# Patient Record
Sex: MALE | Race: WHITE | Employment: OTHER | URBAN - METROPOLITAN AREA
[De-identification: names, ages, dates, MRNs, and addresses within clinical notes are randomized per-mention and may not be internally consistent; named-entity substitution may affect disease eponyms.]

---

## 2017-01-16 ENCOUNTER — HOSPITAL ENCOUNTER (OUTPATIENT)
Dept: WOUND CARE | Age: 77
Discharge: HOME | End: 2017-01-16
Payer: MEDICARE

## 2017-01-16 PROCEDURE — 97597 DBRDMT OPN WND 1ST 20 CM/<: CPT

## 2017-01-16 PROCEDURE — 99212 OFFICE O/P EST SF 10 MIN: CPT

## 2017-01-16 RX ORDER — LIDOCAINE HYDROCHLORIDE 20 MG/ML
JELLY TOPICAL
Status: COMPLETED | OUTPATIENT
Start: 2017-01-16 | End: 2017-01-16

## 2017-01-16 RX ADMIN — LIDOCAINE HYDROCHLORIDE 5 ML: 20 JELLY TOPICAL at 14:26

## 2017-01-17 NOTE — PROGRESS NOTES
[address] [place] [facility] [address] [place] [address]   WOUND CARE PROGRESS NOTE       Name:  Sandra Moe   MR#:  [id number]   :  1940   Account #:  [de-identified]        Date of Adm:  2017       DATE OF SERVICE: 2017      TREATING PHYSICIAN: Aime Blanca DPM    SUBJECTIVE: The patient presents today with his wife complaining of   a sore spot on the tip of his left great toe. The patient states that this   has been present for about a month. He denies any acute trauma to   the area. The wife states that the toe is very tender. He states that she   noticed some dry dead skin along the tip of the toe, which he tried to   trim on her own, but it was too painful for the patient to tolerate. The   patient states he is getting fitted for his diabetic shoes this Wednesday. States his blood sugars have been in the 140s during the day and   maybe 190s at night. He arrived wearing sandals today. Wife states   that the patient will ambulate at home wearing sock feet. PHYSICAL EXAMINATION   VITAL SIGNS: Stable. The patient is afebrile. LEFT FOOT EXAM: There is a 0.5 x 0.5 x 0.1 cm wound along the   distal tip of the left great toe. The wound has a granular base with no   acute signs of infection. DP, PT pulses are nonpalpable. The foot is   cool to touch. Protective sensation is diminished along the toes. ASSESSMENT   1. Left great toe wound. 2. Diabetes with peripheral neuropathy. 3. Acquired deformity, left great toe. PLAN   1. I had a long discussion with the patient and his wife this afternoon. Clinically, it appears that the patient developed a pressure wound   along the tip of his great toe. Upon further discussion with the patient, it   turns out that he wore a pair of sneakers recently which may have   been the contributing factor. 2. I explained to them that clinically the wound is superficial and does   not appear to be infected.  We will start Aquacel AG and a dry bandage   every day. 3. The patient is to get fitted for his diabetic shoes this Wednesday and   that shoe will be the most appropriate type of shoe gear for him to   wear considering that he will need something that has a wide toe box   with room for his toes to spread out. 4. The patient to follow with me in 2 weeks for a recheck. I advised him   to work on getting his blood sugars under control as that would have a   direct impact on his wound healing. KINA Singh / Mya Telles   D:  01/16/2017   14:49   T:  01/16/2017   19:35   Job #:  [id number]     FAX COPY OF REPORT TO DR. Linus Gilliam.

## 2017-02-13 ENCOUNTER — HOSPITAL ENCOUNTER (OUTPATIENT)
Dept: WOUND CARE | Age: 77
Discharge: HOME | End: 2017-02-13
Payer: MEDICARE

## 2017-02-13 PROCEDURE — 97597 DBRDMT OPN WND 1ST 20 CM/<: CPT

## 2017-02-13 RX ORDER — LIDOCAINE HYDROCHLORIDE 20 MG/ML
JELLY TOPICAL
Status: COMPLETED | OUTPATIENT
Start: 2017-02-13 | End: 2017-02-13

## 2017-02-13 RX ADMIN — LIDOCAINE HYDROCHLORIDE 5 ML: 20 JELLY TOPICAL at 13:20

## 2017-02-13 NOTE — PROGRESS NOTES
[address] [place] [facility] [address] [place] [address]   WOUND CARE PROGRESS NOTE       Name:  Paul Faust   MR#:  [id number]   :  1940   Account #:  [de-identified]        Date of Adm:  2017       DATE OF SERVICE: 2017    TREATING PHYSICIAN: Terri Parish DPM    SUBJECTIVE: The patient presents today with his wife for followup to   left distal great toe wound. He denies any nausea, vomiting, fevers,   night sweats or chills. States that he is still waiting on his diabetic   shoes. States that  ordered the wrong type of shoe, and he had   to have his order put in again to get the correct pair of shoes. PHYSICAL EXAMINATION   VITAL SIGNS: Stable. The patient is afebrile. LEFT FOOT: There 0.1 x 0.1 x 0.1 cm wound along the distal tip of the   left great toe. The wound is granular and superficial without any acute   signs of infection. DP and PT pulses are palpable. Protective sensation   is diminished. ASSESSMENT   1. Left great toe ulcer. 2. Diabetes, with peripheral neuropathy. PLAN   1. I had a long discussion with the patient and his wife this afternoon. Clinically, his left great toe wound has healed remarkably. The wound   itself is about 99% healed. I instructed the wife, who is doing his wound   care to, just switch to a dry bandage and change it every day. The   wound should finish healing within the next week or so. 2. The patient is to start wearing his diabetic shoes once he gets them   and they are fitted appropriately. 3. The patient is discharged from the wound care center. He is to   follow with me in my regular office for his normal diabetic checkups.         KINA [facility]   D:  2017   14:44   T:  2017   16:14   Job #:  [id number]

## 2017-03-20 ENCOUNTER — HOSPITAL ENCOUNTER (OUTPATIENT)
Dept: WOUND CARE | Age: 77
Discharge: HOME | End: 2017-03-20
Payer: MEDICARE

## 2017-03-20 PROCEDURE — 97597 DBRDMT OPN WND 1ST 20 CM/<: CPT

## 2017-03-20 RX ORDER — TRAMADOL HYDROCHLORIDE 50 MG/1
50 TABLET ORAL
COMMUNITY
End: 2019-10-14

## 2017-03-21 NOTE — PROGRESS NOTES
[address] [facility] [address] [place] [address]   WOUND CARE PROGRESS NOTE       Name:  Ernestina Duncan   MR#:  [id number]   :  1940   Account #:  [de-identified]        Date of Adm:  2017       DATE OF SERVICE: 2007    TREATING PHYSICIAN: Yumiko Hernandez DPM    SUBJECTIVE: The patient presents today complaining that he has a   wound on the tip of his left great toe as well as the bottom of his left   heel. States that the wounds have been present for the past few   weeks. States his wife has been doing his wound care every day   consisting of Aquacel AG every day. The patient arrives wearing his   diabetic shoes. Denies any trauma to his left foot. Denies any nausea,   vomiting, fevers, night sweats, chills. SUBJECTIVE   VITAL SIGNS: Stable. The patient is afebrile. LEFT FOOT EXAM: There is a 0.5 x 0.7 x 0.1 cm wound along the   distal tip of the left great toe. The wound has a granular base and is   covered by soft eschar. There is no purulent drainage, no Malodor, no   erythema, no edema, no probe to bone. There is also a 0.5 x 0.3 x 0.1   cm wound along the plantar central aspect of the left heel. The wound   is covered with a soft eschar. There are no acute signs of infection. No   deep probe. DP pulse is palpable, PT pulse is weakly palpable. Protect   sensation is diminished. ASSESSMENT   1. Left great toe wound. 2. Left heel wound. 3. Diabetes with peripheral neuropathy. PLAN: I had a long discussion with the patient this afternoon. All   wound sites were anesthetized with topical lidocaine, and once   anesthesia was achieved, they were excisionally debrided using sterile   curette down to a bleeding granular base. Clinically, both wounds are very superficial and granular. I am not sure   why these wounds reappeared, as they were healed at his last visit   and he has been in diabetic shoes, which he started to wear recently.      We will continue Aquacel , and I have instructed the patient to have   his wife to change the routine from every day to every other day. He is to continue using his diabetic shoes. He is to followup with me in 2 weeks for a recheck.         KINA Perez / Jolene Alonzo   D:  03/20/2017   15:04   T:  03/20/2017   22:20   Job #:  [id number]

## 2017-04-03 ENCOUNTER — HOSPITAL ENCOUNTER (OUTPATIENT)
Dept: WOUND CARE | Age: 77
Discharge: HOME | End: 2017-04-03
Payer: MEDICARE

## 2017-04-03 PROCEDURE — 97597 DBRDMT OPN WND 1ST 20 CM/<: CPT

## 2017-04-03 RX ORDER — LIDOCAINE HYDROCHLORIDE 20 MG/ML
JELLY TOPICAL
Status: COMPLETED | OUTPATIENT
Start: 2017-04-03 | End: 2017-04-03

## 2017-04-03 RX ADMIN — LIDOCAINE HYDROCHLORIDE 5 ML: 20 JELLY TOPICAL at 14:54

## 2017-04-04 NOTE — PROGRESS NOTES
[address] [place] [facility] [place] [address]   WOUND CARE PROGRESS NOTE       Name:  Jacqualine Boeck   MR#:  [id number]   :  1940   Account #:  [de-identified]        Date of Adm:  2017       DATE OF SERVICE: 2017    TREATING PHYSICIAN: Jerome Encarnacion DPM    SUBJECTIVE: The patient presents today with his wife for followup to   the left distal great toe as well as left heel wounds. Wife states that he   has developed a new blister on the inner aspect of his left heel that she   noticed a few days ago. She states she did not pop it. States that it   was clear in color at first, but now is dark in color. The patient denies   any nausea, vomiting, fevers, night sweats or chills. PHYSICAL EXAMINATION   VITAL SIGNS: Stable. The patient is afebrile. LEFT FOOT: There is a 1.8 x 2.3 x 0.1 cm blister along the medial   aspect of the left heel, the blister appears to contain dark coagulated   blood. There is no surrounding erythema. There is a distal left great toe   wound measuring 0.5 x 0.5 x 0.1 cm with superficial granular base with   no acute signs of infection; as well as a posterior heel wound   measuring 0.7 x 0.5 x 0.1 cm with superficial granular base, no acute   signs of infection. DP, PT pulses are palpable. Protective sensation is   diminished. ASSESSMENT   1. Left diabetic foot wounds x2.   2. Left heel blister. 3. Diabetes with peripheral neuropathy. PLAN   1. At today's visit, I used a 15 blade to incise the blister multiple times   in order to allow all underlying drainage to be expressed. The drainage   consists of coagulated blood, there was no pus.   2. Using a 15 blade, I excisionally debrided the left great toe wound. We will continue Aquacel to the tip   of the left great toe. The heel wound appears to be dry and we will stop   Aquacel on this.    3. I instructed the patient's wife to apply to a non-adherent bandage to   the left medial heel blister and dry dressing to the left heel wound and   only use Aquacel on the tip of the left great toe. Clinically, the left great   toe wound appears to be due to microtrauma or some type of irritation. The patient does admit to stubbing that toe on occasion, however, he   is not clear how often he does do it. With his sense of neuropathy, I   have a strong feeling that he is bumping the toe more than he believes   he is. 4. The patient to follow up in 2 weeks for a recheck.         KINA Salgado / KASIA   D:  04/03/2017   16:21   T:  04/03/2017   23:58   Job #:  [id number]

## 2017-04-17 ENCOUNTER — HOSPITAL ENCOUNTER (OUTPATIENT)
Dept: WOUND CARE | Age: 77
Discharge: HOME | End: 2017-04-17
Payer: MEDICARE

## 2017-04-17 PROCEDURE — 97597 DBRDMT OPN WND 1ST 20 CM/<: CPT

## 2017-04-18 NOTE — PROGRESS NOTES
[address] [place] [facility] [address] [place] [address]   WOUND CARE PROGRESS NOTE       Name:  Rm Rick   MR#:  [id number]   :  1940   Account #:  [de-identified]        Date of Adm:  2017       SUBJECTIVE: The patient comes today for follow up of his diabetic   foot ulcer to the distal left great toe, secondary to diabetes mellitus,   E11.621. The other to his left heel and food have healed. He is not   accompanied by a family member, but reports no other significant   change in his health or review of systems. No nausea, vomiting,   diarrhea, or fever, chills, or night sweats. OBJECTIVE   EXTREMITIES: On exam today, the ulcer is slightly larger, and   covered by a thick, black eschar. The measurement is 0.8 x 0.8 x 0.1. He has redness to the great toe to his interphalangeal joint, but no   warmth. There is no odor, no drainage noted, and it looks like he has   had this for a while. I did outline this area, and we discussed this at the   end of the clinic visit. After discussing risks and benefits, obtained informed consent,   identifying the patient and taking a time-out to discuss selective   debridement of his thick, dark eschar, Xylocaine gel was applied, as   well as saline to hydrate the eschar. A 5 mm ring curet was used to   remove this eschar down to healthy, bleeding tissue. The   measurements did not change. We will switch from Aquacel AG,   which I think may be drawing it out too much, to Nevaeh, with Hydrogel. He will change this every 3 days, and we have written instructions for   he and his wife. Dr. Radha Valero will see him back next week. He is   advised to call if the redness extends beyond the demarcation lines   that I placed on him today.          MD Kamilla Mueller / Versie Clubs   D:  2017   16:07   T:  2017   00:08   Job #:  [id number]

## 2017-05-02 ENCOUNTER — HOSPITAL ENCOUNTER (OUTPATIENT)
Dept: WOUND CARE | Age: 77
Discharge: HOME | End: 2017-05-02
Payer: MEDICARE

## 2017-05-02 PROCEDURE — 11042 DBRDMT SUBQ TIS 1ST 20SQCM/<: CPT

## 2017-05-02 RX ORDER — LIDOCAINE HYDROCHLORIDE 20 MG/ML
JELLY TOPICAL
Status: COMPLETED | OUTPATIENT
Start: 2017-05-02 | End: 2017-05-02

## 2017-05-02 RX ADMIN — LIDOCAINE HYDROCHLORIDE: 20 JELLY TOPICAL at 13:58

## 2017-05-02 NOTE — PROGRESS NOTES
[address] [facility], [address] [facility]   WOUND CARE PROGRESS NOTE       Name:  Wilton Patel   MR#:  [id number]   :  1940   Account #:  [de-identified]        Date of Adm:  2017       DATE OF SERVICE: 2017    CHIEF COMPLAINT: \"I have a wound on my left great toe. \"    HISTORY OF PRESENT ILLNESS: The patient is following up at the   wound care clinic for a diabetic foot ulcer on the distal aspect of his left   great toe. The patient is doing regular dressing changes. He has no   pain associated with the wound. He has been offloading the end of his   great toe by wearing sandals. He denies any nausea, vomiting, fevers   or chills. He has moderate diabetic control. PHYSICAL EXAMINATION   GENERAL: On exam, he is sitting comfortably on the bed. VITAL SIGNS: His temperature is 98.1, respirations 20, pulse 62,   blood pressure is 122/69. Left great toe wound measures 0.8 cm in   length, 0.8 cm in width and 0.1 cm in depth. There is some slough and   necrotic tissue with an eschar and there is a clear serous drainage. There is no surrounding erythema. There is no swelling. There is no   odor. There is no tenderness. ASSESSMENT: Diabetic foot ulcer, Blum grade 1, stable. Treatment   today consisted of debridement of the subcutaneous tissue of less than   20 square cm. Consent was obtained. Time-out was taken. Lidocaine   2% topical gel was used as anesthetic agent. A 5-0 curette was used   to remove the excess slough and necrotic subcutaneous tissue, both   adherent and nonadherent, both nonviable and viable tissue was   removed and discarded. The patient tolerated the procedure well. There was a minimal amount of bleeding, which was controlled from   pressure from a gauze. The posterior measurement was 0.8 cm in   length, 0.8 cm in width and 0.2 cm in depth. The wound was then   cleaned with saline.  Santyl was applied, followed moist gauze, then dry gauze.    PLAN: The patient will continue with regular dressing. I have   recommended he get more formal noninvasive arterial studies. If any   issues, he is going to call or come back sooner; otherwise, he will be   re-evaluated in 1 week.         Gisela Sinclair MD      PV / THS   D:  05/02/2017   15:08   T:  05/02/2017   15:33   Job #:  [id number]

## 2017-05-08 ENCOUNTER — HOSPITAL ENCOUNTER (OUTPATIENT)
Dept: VASCULAR SURGERY | Age: 77
Discharge: HOME | End: 2017-05-08
Attending: FAMILY MEDICINE
Payer: MEDICARE

## 2017-05-08 DIAGNOSIS — E11.69 DIABETIC FOOT ULCER WITH OSTEOMYELITIS (HCC): ICD-10-CM

## 2017-05-08 DIAGNOSIS — L97.522 ULCER OF LEFT FOOT, WITH FAT LAYER EXPOSED (HCC): ICD-10-CM

## 2017-05-08 DIAGNOSIS — M86.9 DIABETIC FOOT ULCER WITH OSTEOMYELITIS (HCC): ICD-10-CM

## 2017-05-08 DIAGNOSIS — E11.621 DIABETIC FOOT ULCER WITH OSTEOMYELITIS (HCC): ICD-10-CM

## 2017-05-08 DIAGNOSIS — L97.509 DIABETIC FOOT ULCER WITH OSTEOMYELITIS (HCC): ICD-10-CM

## 2017-05-08 PROCEDURE — 93923 UPR/LXTR ART STDY 3+ LVLS: CPT

## 2017-05-08 NOTE — PROCEDURES
[address] [place] [address]  *** FINAL REPORT ***    Name: Lali Prado  MRN: [id number]    Outpatient  : 15 Feb 1940  HIS Order #: [id number]  [address] [facility] Visit #: [id number]  Date: 08 May 2017    TYPE OF TEST: Peripheral Arterial Testing    REASON FOR TEST  Wound lrft great toe,  REGINA and TBI    Right Leg  Segmentals: Normal                     mmHg  Brachial         102  High thigh  Low thigh        140  Calf             119  Posterior tibial 133  Dorsalis pedis    82  Peroneal  Metatarsal  Toe pressure      51  Doppler:  PVR:  Ankle/Brachial: 1.17    Left Leg  Segmentals: Normal                     mmHg  Brachial         114  High thigh  Low thigh        125  Calf             109  Posterior tibial 140  Dorsalis pedis   121  Peroneal  Metatarsal  Toe pressure      80  Doppler:  PVR:  Ankle/Brachial: 1.23    INTERPRETATION/FINDINGS  PROCEDURE:  Evaluation of lower extremity arteries with multilevel  systolic blood pressure measurements and pulse volume recording (PVR)  plethysmography. Includes calculation of the ankle/brachial pressure  indices (REGINA's). FINDINGS:  REGINA is normal on the right and the left. PVR waveforms on  the right are normal at the thigh, calf, and ankle. PVR waveforms on  the left are normal at the thigh, calf, and ankle. PPG waveforms are  severely abnormal at the right great toe and normal at the left great  toe. Right great toe pressure is reduced    IMPRESSION:  No evidence of hemodynamically significant lower  extremity arterial obstruction. There is reduced pressure and flow at   the right great toe. ADDITIONAL COMMENTS    I have personally reviewed the data relevant to the interpretation of  this  study.     TECHNOLOGIST: Bj Monae RVT  Signed: 2017 04:05 PM    PHYSICIAN: Joce Cisneros MD  Signed: 2017 07:12 AM

## 2017-05-09 ENCOUNTER — HOSPITAL ENCOUNTER (OUTPATIENT)
Dept: WOUND CARE | Age: 77
Discharge: HOME | End: 2017-05-09
Payer: MEDICARE

## 2017-05-09 PROCEDURE — 11042 DBRDMT SUBQ TIS 1ST 20SQCM/<: CPT

## 2017-05-09 RX ORDER — LIDOCAINE HYDROCHLORIDE 20 MG/ML
JELLY TOPICAL
Status: COMPLETED | OUTPATIENT
Start: 2017-05-09 | End: 2017-05-09

## 2017-05-09 RX ADMIN — LIDOCAINE HYDROCHLORIDE: 20 JELLY TOPICAL at 14:04

## 2017-05-09 NOTE — PROGRESS NOTES
[address] [place] [facility] [address]   WOUND CARE PROGRESS NOTE       Name:  Tameka Rubalcava   MR#:  [id number]   :  1940   Account #:  [de-identified]        Date of Adm:  2017       DATE OF SERVICE: 2017. CHIEF COMPLAINT: \"I have a wound on my left great toe. \" . HISTORY OF PRESENT ILLNESS: The patient is following up at the   wound care clinic for a diabetic foot ulcer on the distal aspect of his left   great toe. The patient had his noninvasive arterial studies completed,   which showed no significant hemodynamic blockage in the left foot. There was some decreased perfusion in the right great toe. The patient   had been doing a regular dressing change with the assistance of his   wife with the application of Santyl. He has been offloading the toe by   wearing open shoe sandals. He denies any nausea, vomiting, fevers or   chills. He has no pain associated with the wound. PHYSICAL EXAMINATION   GENERAL: On exam, he is sitting comfortably in the bed. VITAL SIGNS: His temperature 98.3, respirations 16, pulse 72, blood   pressure 149/74. EXTREMITIES: Has a wound on the distal aspect of his left great toe   that measures 0.6 cm in length, 0.6 cm in width, and 0.1 cm in depth. Some slough and necrotic subcutaneous tissue. There is a clear   serous discharge. There is no surrounding erythema. There is no odor. There is no tenderness. ASSESSMENT: Diabetic foot ulcer, Blum grade 1, stable. Treatment today consisted of debridement of subcutaneous tissue   of less than 20 square cm. Consent was obtained. Risks were   reviewed. A time-out was taken. Lidocaine 2% topical gel was used as   anesthetic agent. A 5-0 curette was used to remove the excess slough   and necrotic subcutaneous tissue. Both adherent and nonadherent,   both viable and nonviable tissue was removed and discarded. The   patient tolerated the procedure well.  There was minimal amount of   bleeding, which was controlled from pressure from a gauze. The post-  debridement measurement was 0.6 cm in length, 0.6 cm in width, and   0.2 cm in depth. The wound was cleaned with saline. Santyl was   applied, followed by gauze and then dry gauze. PLAN: The patient will continue with the daily dressing changes with   the application of Santyl. He is encouraged to continue offloading his   toe. If he has any issues, he will call or come back sooner; otherwise,   he will be reevaluated in 1 week.         Colletta Mccune, MD Bubba Haggis / Cassandra.Fill   D:  05/09/2017   14:46   T:  05/09/2017   15:10   Job #:  [id number]

## 2017-05-16 ENCOUNTER — HOSPITAL ENCOUNTER (OUTPATIENT)
Dept: WOUND CARE | Age: 77
Discharge: HOME | End: 2017-05-16
Payer: MEDICARE

## 2017-05-16 PROCEDURE — 74011000250 HC RX REV CODE- 250: Performed by: FAMILY MEDICINE

## 2017-05-16 PROCEDURE — 11042 DBRDMT SUBQ TIS 1ST 20SQCM/<: CPT

## 2017-05-16 RX ORDER — LIDOCAINE HYDROCHLORIDE 20 MG/ML
JELLY TOPICAL
Status: COMPLETED | OUTPATIENT
Start: 2017-05-16 | End: 2017-05-16

## 2017-05-16 RX ADMIN — LIDOCAINE HYDROCHLORIDE: 20 JELLY TOPICAL at 13:42

## 2017-05-16 NOTE — PROGRESS NOTES
[address] [place] [facility] [address] [facility]   WOUND CARE PROGRESS NOTE       Name:  Carolyn Hathaway   MR#:  [id number]   :  1940   Account #:  [de-identified]        Date of Adm:  2017       DATE OF SERVICE: 2017. CHIEF COMPLAINT: \"I have a wound on my left great toe. \"     HISTORY OF PRESENT ILLNESS: The patient is following up at the   wound care clinic for a diabetic foot ulcer on the distal aspect of his left   great toe. The patient has been doing well this past week with regular   dressing changes applying Santyl with the assistance of his wife. He   has no pain associated with the wound. He denies any nausea,   vomiting, fevers or chills. He has been offloading the toe while   wearing sandals and avoiding pressure from other means. PHYSICAL EXAMINATION   GENERAL: On exam, he is sitting comfortably on the bed. VITAL SIGNS: His temperature 97.7, his respirations 18, pulse 54,   blood pressure is 171/70. EXTREMITIES: He has a wound on his left great toe that measures   0.8 cm in length, 0.9 cm in width and 0.1 cm in depth. There is some   slough. There is some necrotic tissue. There is a clear serous   drainage. There is no sign of erythema. There is no odor. There is no   tenderness. ASSESSMENT: Diabetic foot ulcer of the left great   toe, stable. Considered a Blum grade 1. Treatment today consisted   of debridement of subcutaneous tissue of less than 20 square cm. Consent was obtained. Risks were reviewed. A time-out was taken. Lidocaine 2% topical gel was used as an anesthetic agent. A 5-0   curette was used to remove the excess slough and necrotic   subcutaneous tissue. Both adherent and nonadherent, both viable and   nonviable tissue was removed and discarded. The patient tolerated the   procedure well. There was a minimal amount of bleeding, which was   controlled with pressure from a gauze.  The post-debridement   measurement was 0.8 cm in length, 0.9 cm in width, and 0.2 cm in   depth. The wound was then cleaned with saline. Santyl was applied,   followed by a moist gauze, then a dry gauze. PLAN: The patient will continue with his daily dressing with the   application of Santyl. He was encouraged to elevate his legs. I   recommended that he continue the offloading by wearing the sandals   and avoiding other forms of pressure. If he has any issues, he will call   or come back sooner. Otherwise, he will be reevaluated in 1 week.         MD YARY Vera / Lilly.Hives   D:  05/16/2017   14:28   T:  05/16/2017   15:02   Job #:  [id number]

## 2017-05-23 ENCOUNTER — HOSPITAL ENCOUNTER (OUTPATIENT)
Dept: WOUND CARE | Age: 77
Discharge: HOME | End: 2017-05-23
Payer: MEDICARE

## 2017-05-23 PROCEDURE — 11042 DBRDMT SUBQ TIS 1ST 20SQCM/<: CPT

## 2017-05-23 NOTE — PROGRESS NOTES
[address] [place] [facility] [address]   WOUND CARE PROGRESS NOTE       Name:  Alesha Aguilar   MR#:  [id number]   :  1940   Account #:  [de-identified]        Date of Adm:  2017       DATE OF SERVICE: 2017    CHIEF COMPLAINT: \"I have a wound on my left great toe. \"    HISTORY OF PRESENT ILLNESS: The patient is following up at the   wound care clinic for a diabetic foot ulcer on the distal aspect of his left   great toe. The patient has been doing well since the last visit, applying   Santyl on a regular basis. He has no pain associated with the wound. He has been offloading the toe by wearing an open toed shoe. Denies   any nausea, vomiting, fevers or chills. PHYSICAL EXAMINATION   GENERAL: On exam, he is sitting comfortably in the chair. VITAL SIGNS: His temperature is 98.2, respirations 18, pulse 65,   blood pressure 135/64. EXTREMITIES: He has a wound on the distal aspect of the left great   toe that measures 0.3 cm in length, 0.2 cm in width and 0.1 cm in   depth. There is some slough and necrotic subcutaneous tissue. There   is clear serous discharge. There is no sign erythema. There is no odor. There is no tenderness. ASSESSMENT: Diabetic foot ulcer of the left great toe, healing. The   wound may be considered a Blum grade 1 wound. Treatment today   consisted of debridement of the subcutaneous tissue of less than 20   square cm. Consent was obtained. Risks were reviewed. Time-out was   taken. Lidocaine 2% topical gel was used as anesthetic agent. A 5-0   curette was used to remove the excess slough and necrotic   subcutaneous tissue, both adherent and nonadherent, both viable and   nonviable tissue was removed and discarded. The patient tolerated the   procedure well. There was a minimal amount of bleeding, which was   controlled from pressure from a gauze.  The post-debridement   measurement was 0.3 cm in length, 0.2 cm in width and 0.2 cm in depth. The wound was cleaned with saline and Santyl was applied,   followed by a moist gauze and dry gauze. PLAN: The patient will continue with the daily dressing and offloading. If he has any issues, he will call or come back sooner; otherwise, he   will be reevaluated in 2 weeks.         MD YARY Gilbert / JUAN MIGUEL   D:  05/23/2017   13:43   T:  05/23/2017   14:04   Job #:  [id number]

## 2017-06-06 ENCOUNTER — HOSPITAL ENCOUNTER (OUTPATIENT)
Dept: WOUND CARE | Age: 77
Discharge: HOME | End: 2017-06-06
Payer: MEDICARE

## 2017-06-06 PROCEDURE — 11042 DBRDMT SUBQ TIS 1ST 20SQCM/<: CPT

## 2017-06-06 PROCEDURE — 74011000250 HC RX REV CODE- 250: Performed by: FAMILY MEDICINE

## 2017-06-06 RX ORDER — LIDOCAINE HYDROCHLORIDE 20 MG/ML
JELLY TOPICAL
Status: COMPLETED | OUTPATIENT
Start: 2017-06-06 | End: 2017-06-06

## 2017-06-06 RX ADMIN — LIDOCAINE HYDROCHLORIDE 5 ML: 20 JELLY TOPICAL at 13:43

## 2017-06-06 NOTE — PROGRESS NOTES
[address] [place] [facility], [address] [facility]   WOUND CARE PROGRESS NOTE       Name:  Lizbeth Montgomery   MR#:  [id number]   :  1940   Account #:  [de-identified]        Date of Adm:  2017       DATE OF SERVICE: 2017    CHIEF COMPLAINT: \"I have a wound on my left great toe. \"     HISTORY OF PRESENT ILLNESS: The patient is following up at the   wound clinic for a diabetic foot ulcer on the distal aspect of his left   great toe. The patient was doing well over the past week, applying   Santyl and covered with a dressing. He has no pain associated with   the wound. He has been offloading his toe by wearing an open toe   shoe. He denies any nausea, vomiting, fevers or chills. PHYSICAL EXAMINATION   GENERAL: On exam, he is sitting comfortably in the bed. VITAL SIGNS: His temperature is 97.8, respirations 20, pulse 53,   blood pressure is 154/72. EXTREMITIES: He has a wound on the distal aspect of the left great   toe that measures 0.3 cm in length, 0.7 cm in width and 0.1 cm in   depth. There is some slough and necrotic subcutaneous tissue and   some periwound maceration. There is a clear serous discharge. There   is no surrounding erythema. There is no tenderness. ASSESSMENT: Diabetic foot ulcer of the left great toe, stable. The   wound would be considered a Blum grade 1 wound. Treatment today consisted of debridement of the subcutaneous tissue   of less than 20 square cm. Consent was obtained. Risks were   reviewed. Time-out was taken. Lidocaine 2% topical gel was used as   the anesthetic agent. A 5.0 curette was used to remove the excess   slough and necrotic subcutaneous tissue. Both adherent and   nonadherent, both viable and nonviable tissue was removed and   discarded. The patient tolerated the procedure well. There was a   minimal amount of bleeding, which was controlled from pressure from   a gauze.  The post-debridement measurement was 0.3 cm in length, 0.7 cm in width and 0.2 cm in depth. The wound was cleaned with   saline and Hydrofera Blue Ready was applied and then secured with   rolled gauze. PLAN: The patient will continue with regular dressing change every   other day and applying Hydrofera Blue. He will continue with offloading   by wearing open toe shoe. He is also encouraged to elevate his legs   equal to his heart when he is at rest.     If there are any other issues, he will call or come in sooner; otherwise,   he will be reevaluated in 1 week.         Te Desai MD      PV / MS   D:  06/06/2017   14:00   T:  06/06/2017   14:21   Job #:  [id number]

## 2017-06-13 ENCOUNTER — HOSPITAL ENCOUNTER (OUTPATIENT)
Dept: WOUND CARE | Age: 77
Discharge: HOME | End: 2017-06-13
Payer: MEDICARE

## 2017-06-13 PROCEDURE — 99213 OFFICE O/P EST LOW 20 MIN: CPT

## 2017-06-13 NOTE — PROGRESS NOTES
[address] [facility] [place] [address]   WOUND CARE PROGRESS NOTE       Name:  Lewis Mallory   MR#:  [id number]   :  1940   Account #:  [de-identified]        Date of Adm:  2017       DATE OF SERVICE: 2017    CHIEF COMPLAINT: \"I have a wound on my left great toe. \"    HISTORY OF PRESENT ILLNESS: The patient is following up through   clinic for diabetic foot ulcer on the distal aspect of his left great toe. The   patient has been doing well this past week, having regular dressing   change with the assistance of his wife. He has been applying   Hydrofera Blue. He has been offloading the area by wearing open-toed   shoes. He has some pain associated with the wound and the whole   great toe. He has not noticed any change in the degree of pain or   swelling or redness. He denies any nausea, vomiting, fevers or chills. PHYSICAL EXAMINATION   GENERAL: On exam, he is sitting comfortably in the bed. VITAL SIGNS: His temperature is 97.9, respirations 22. Pulse 56,   blood pressure 149/73. EXTREMITIES: He has a wound on the distal aspect of his left great   toe that measures 0.3 cm in length, 0.8 cm in width and 0.1 cm in   depth. There is no slough. There is pink, spongy granulation tissue. There is a clear serous discharge. There is no surrounding erythema. There is no odor. There is slight tenderness of the wound on the toe. ASSESSMENT: Blum grade 1 diabetic foot ulcer of the left great toe   not healing. Treatment today consisted of cleaning the wound with   saline and then applying Santyl followed by [facility]. PLAN: I was concerned that the wound, although it is not worsening,   has not shown any healing over the past number of weeks. I would like   to get a more detailed vascular assessment, even though the   noninvasive studies with toe pressures were normal of the left great   toe.  I am concerned that there might be some microvascular process and that is limiting the healing, although I understand if that is the case,   then there is very little that can be done. I also want to assess the   wound for may be some underlying bony abnormalities since the whole   toe seems to be a little bit tender. I have requested that he complete   an MRI with contrast to check for osteo. I have ordered blood work at   [facility] and sedimentation rate and a C-reactive protein in addition to these   other consults. If he has issues, he will call or come back sooner;   otherwise, he will be reevaluated in 1 week.         Amita Lloyd MD      PV / CONRAD   D:  06/13/2017   15:55   T:  06/13/2017   16:23   Job #:  [id number]

## 2017-06-20 ENCOUNTER — HOSPITAL ENCOUNTER (OUTPATIENT)
Dept: WOUND CARE | Age: 77
Discharge: HOME | End: 2017-06-20
Payer: MEDICARE

## 2017-06-20 PROCEDURE — 97597 DBRDMT OPN WND 1ST 20 CM/<: CPT

## 2017-06-20 NOTE — PROGRESS NOTES
1500 [place] [facility] [address] [facility]   WOUND CARE PROGRESS NOTE       Name:  Marilyn Wan   MR#:  [id number]   :  1940   Account #:  [de-identified]        Date of Adm:  2017       DATE OF SERVICE: 2017     CHIEF COMPLAINT: \"I have a wound on my left great toe. \"    HISTORY OF PRESENT ILLNESS: The patient is following up at the   wound care clinic for a diabetic foot ulcer on the distal aspect of his left   great toe. The patient has been doing well this past week with regular   dressing change, applying Santyl and Hydrofera Blue with the   assistance from his wife. He has been offloading the area by wearing   open-toed shoes. He has no change in the degree of pain with the   wound. He completed his MRI study this past week. He also followed   up with the vascular surgeon. He denies any nausea, vomiting, fevers   or chills. PHYSICAL EXAMINATION: He is sitting comfortably in the chair. His   temperature is 98.0, respirations 22, pulse 54, blood pressure 111/64. He has a wound on the distal aspect of the left great toe that measures   0.2 cm in length, 0.3 cm in width and 0.1 cm in depth. There is some   slough. There is no necrotic tissue. There is a clear serous discharge. There is no surrounding erythema. There is no odor. There is no   tenderness. ASSESSMENT: Large grade 1 ulcer on the dorsal aspect of the left   great toe, stable. MRI was completed, which revealed underlying   osteomyelitis of the distal phalanx. Treatment today consisted of open   debridement of less than 20 square cm. Consent was obtained. Risks   reviewed. A time-out was taken. Lidocaine 2% topical gel was used as   anesthetic agent. A 5-0 curette was used to remove the excess slough,   which was discarded. There was a minimal amount of bleeding, which   was controlled with pressure from a gauze. The patient tolerated the   procedure well.  The post-debridement measurements were 0.2 cm in   length, 0.3 cm in width and 0.1 cm in depth. The wound was cleaned   with saline, and then Santyl was applied, followed by [facility]. PLAN: The patient will follow up next week with Podiatry. I also   recommended that he see Infectious Disease. The patient wanted to   wait until his Podiatry appointment to help determine the next step. The   patient did not get the blood work that I have ordered completed, he   may try to do that this week. If he has any issues, he will call or come   back sooner. Otherwise, he will be reevaluated next week by Podiatry.         MD YARY Kennedy / Alona Goodell   D:  06/20/2017   15:41   T:  06/20/2017   16:02   Job #:  [id number]

## 2017-06-26 ENCOUNTER — HOSPITAL ENCOUNTER (OUTPATIENT)
Dept: WOUND CARE | Age: 77
Discharge: HOME | End: 2017-06-26
Payer: MEDICARE

## 2017-06-26 PROCEDURE — 99213 OFFICE O/P EST LOW 20 MIN: CPT

## 2017-06-26 RX ORDER — LIDOCAINE HYDROCHLORIDE 20 MG/ML
JELLY TOPICAL
Status: COMPLETED | OUTPATIENT
Start: 2017-06-26 | End: 2017-06-26

## 2017-06-26 RX ADMIN — LIDOCAINE HYDROCHLORIDE: 20 JELLY TOPICAL at 12:57

## 2017-06-26 NOTE — PROGRESS NOTES
[address] [place] [facility] [place] [address] [facility]   WOUND CARE PROGRESS NOTE       Name:  Carolyn Hathaway   MR#:  [id number]   :  1940   Account #:  [de-identified]        Date of Adm:  2017       DATE OF SERVICE: 2017     TREATING PHYSICIAN: Florine Moritz, DPM    SUBJECTIVE: The patient presents today with his wife for followup to   a left great toe ulceration. The patient has been seeing Dr. Rena Grossman   here at the wound care center for this wound. The patient states that   he recently had an MRI of the left foot, as per Dr. Catalina Moya   instructions. The patient states that he wanted to see me due to the   findings on the MRI. He denies any nausea, vomiting, fevers, night   sweats or chills. States blood sugars are doing very well. PHYSICAL EXAMINATION   VITAL SIGNS: Stable. The patient is afebrile. LEFT FOOT: There is a 0.4 x 0.8 x 0.1 cm wound along the distal tip of   the left great toe that probes down to bone. There is no purulent   drainage, no malodor. There is mild localized erythema. DP and PT   pulses are palpable. Protective sensation is diminished. ASSESSMENT   1. Osteomyelitis, left great toe. 2. Diabetes, with peripheral neuropathy. 3. Left great toe ulcer. PLAN   1. I had a long discussion with the patient and his wife this afternoon. I   reviewed the findings of the MRI, which showed osteomyelitis in the   distal phalanx of the left great toe. I explained to the patient that he   basically has 2 options. Conservatively, we need to get him set up for   6-8 weeks of IV antibiotics, along with continued wound care and   hyperbaric oxygen therapy, to try to heal the wound along with the   corresponding infection. I explained that there is no guarantee that   doing all of this will in fact resolve his current issue.  Alternatively, the   other option is because the infection solely isolated to the tip of the   great toe, he would benefit from a distal left great toe amputation in   order to ensure a surgical cure. The patient states that he wants to   move forward with surgery rather than the other approach, because he   realizes that 6-8 weeks is a long time to treat something like this   conservatively, and considering that the wound has been present for 4   months and has not gotten any better, he wants to be more aggressive   before the infection worsens. His wife is in agreement. 2. A prescription   was given for Bactrim DS, a total of 20 tablets, and instructed to take 1   tab by mouth twice a day with food until done. I will reach out to my   office to get the patient scheduled for a partial amputation of the left   great toe. 3. The patient will follow up in my regular office after the surgery.         KINA Contreras / [facility]   D:  06/26/2017   13:58   T:  06/26/2017   14:26   Job #:  [id number]

## 2017-06-30 ENCOUNTER — ANESTHESIA EVENT (OUTPATIENT)
Dept: SURGERY | Age: 77
End: 2017-06-30
Payer: MEDICARE

## 2017-07-03 ENCOUNTER — HOSPITAL ENCOUNTER (OUTPATIENT)
Age: 77
Setting detail: OUTPATIENT SURGERY
Discharge: HOME | End: 2017-07-03
Attending: PODIATRIST | Admitting: PODIATRIST
Payer: MEDICARE

## 2017-07-03 ENCOUNTER — ANESTHESIA (OUTPATIENT)
Dept: SURGERY | Age: 77
End: 2017-07-03
Payer: MEDICARE

## 2017-07-03 VITALS
BODY MASS INDEX: 36.37 KG/M2 | OXYGEN SATURATION: 96 % | TEMPERATURE: 98 F | HEART RATE: 53 BPM | HEIGHT: 68 IN | WEIGHT: 240 LBS | DIASTOLIC BLOOD PRESSURE: 82 MMHG | RESPIRATION RATE: 12 BRPM | SYSTOLIC BLOOD PRESSURE: 134 MMHG

## 2017-07-03 LAB
ANION GAP BLD CALC-SCNC: 15 MMOL/L (ref 5–15)
BUN BLD-MCNC: 26 MG/DL (ref 9–20)
CA-I BLD-MCNC: 1.17 MMOL/L (ref 1.12–1.32)
CHLORIDE BLD-SCNC: 105 MMOL/L (ref 98–107)
CO2 BLD-SCNC: 23 MMOL/L (ref 21–32)
CREAT BLD-MCNC: 1.3 MG/DL (ref 0.6–1.3)
GLUCOSE BLD STRIP.AUTO-MCNC: 160 MG/DL (ref 65–100)
GLUCOSE BLD-MCNC: 171 MG/DL (ref 65–100)
HCT VFR BLD CALC: 28 % (ref 36.6–50.3)
HGB BLD-MCNC: 9.5 GM/DL (ref 12.1–17)
POTASSIUM BLD-SCNC: 4.5 MMOL/L (ref 3.5–5.1)
SERVICE CMNT-IMP: ABNORMAL
SERVICE CMNT-IMP: ABNORMAL
SODIUM BLD-SCNC: 137 MMOL/L (ref 136–145)

## 2017-07-03 PROCEDURE — 77030011640 HC PAD GRND REM COVD -A: Performed by: PODIATRIST

## 2017-07-03 PROCEDURE — 77030002916 HC SUT ETHLN J&J -A: Performed by: PODIATRIST

## 2017-07-03 PROCEDURE — 77030036687 HC SHOE PSTOP S2SG -A

## 2017-07-03 PROCEDURE — 74011250636 HC RX REV CODE- 250/636

## 2017-07-03 PROCEDURE — 77030020754 HC CUF TRNQT 2BLA STRY -B: Performed by: PODIATRIST

## 2017-07-03 PROCEDURE — 77030031139 HC SUT VCRL2 J&J -A: Performed by: PODIATRIST

## 2017-07-03 PROCEDURE — 80047 BASIC METABLC PNL IONIZED CA: CPT

## 2017-07-03 PROCEDURE — 77030002986 HC SUT PROL J&J -A: Performed by: PODIATRIST

## 2017-07-03 PROCEDURE — 76210000020 HC REC RM PH II FIRST 0.5 HR: Performed by: PODIATRIST

## 2017-07-03 PROCEDURE — 76010000138 HC OR TIME 0.5 TO 1 HR: Performed by: PODIATRIST

## 2017-07-03 PROCEDURE — 82962 GLUCOSE BLOOD TEST: CPT

## 2017-07-03 PROCEDURE — 76210000006 HC OR PH I REC 0.5 TO 1 HR: Performed by: PODIATRIST

## 2017-07-03 PROCEDURE — 77030018836 HC SOL IRR NACL ICUM -A: Performed by: PODIATRIST

## 2017-07-03 PROCEDURE — 74011250636 HC RX REV CODE- 250/636: Performed by: PODIATRIST

## 2017-07-03 PROCEDURE — 76060000032 HC ANESTHESIA 0.5 TO 1 HR: Performed by: PODIATRIST

## 2017-07-03 PROCEDURE — 74011250636 HC RX REV CODE- 250/636: Performed by: ANESTHESIOLOGY

## 2017-07-03 PROCEDURE — 88305 TISSUE EXAM BY PATHOLOGIST: CPT | Performed by: PODIATRIST

## 2017-07-03 PROCEDURE — 74011000250 HC RX REV CODE- 250: Performed by: PODIATRIST

## 2017-07-03 PROCEDURE — 88311 DECALCIFY TISSUE: CPT | Performed by: PODIATRIST

## 2017-07-03 RX ORDER — PROPOFOL 10 MG/ML
INJECTION, EMULSION INTRAVENOUS
Status: DISCONTINUED | OUTPATIENT
Start: 2017-07-03 | End: 2017-07-03 | Stop reason: HOSPADM

## 2017-07-03 RX ORDER — MORPHINE SULFATE 10 MG/ML
2 INJECTION, SOLUTION INTRAMUSCULAR; INTRAVENOUS
Status: DISCONTINUED | OUTPATIENT
Start: 2017-07-03 | End: 2017-07-03 | Stop reason: HOSPADM

## 2017-07-03 RX ORDER — DIPHENHYDRAMINE HYDROCHLORIDE 50 MG/ML
12.5 INJECTION, SOLUTION INTRAMUSCULAR; INTRAVENOUS AS NEEDED
Status: DISCONTINUED | OUTPATIENT
Start: 2017-07-03 | End: 2017-07-03 | Stop reason: HOSPADM

## 2017-07-03 RX ORDER — ONDANSETRON 2 MG/ML
INJECTION INTRAMUSCULAR; INTRAVENOUS AS NEEDED
Status: DISCONTINUED | OUTPATIENT
Start: 2017-07-03 | End: 2017-07-03 | Stop reason: HOSPADM

## 2017-07-03 RX ORDER — SULFAMETHOXAZOLE AND TRIMETHOPRIM 800; 160 MG/1; MG/1
1 TABLET ORAL 2 TIMES DAILY
Qty: 14 TAB | Refills: 0 | Status: SHIPPED | OUTPATIENT
Start: 2017-07-03 | End: 2017-07-10

## 2017-07-03 RX ORDER — MIDAZOLAM HYDROCHLORIDE 1 MG/ML
1 INJECTION, SOLUTION INTRAMUSCULAR; INTRAVENOUS AS NEEDED
Status: DISCONTINUED | OUTPATIENT
Start: 2017-07-03 | End: 2017-07-03 | Stop reason: HOSPADM

## 2017-07-03 RX ORDER — MIDAZOLAM HYDROCHLORIDE 1 MG/ML
INJECTION, SOLUTION INTRAMUSCULAR; INTRAVENOUS AS NEEDED
Status: DISCONTINUED | OUTPATIENT
Start: 2017-07-03 | End: 2017-07-03 | Stop reason: HOSPADM

## 2017-07-03 RX ORDER — PROPOFOL 10 MG/ML
INJECTION, EMULSION INTRAVENOUS AS NEEDED
Status: DISCONTINUED | OUTPATIENT
Start: 2017-07-03 | End: 2017-07-03 | Stop reason: HOSPADM

## 2017-07-03 RX ORDER — FENTANYL CITRATE 50 UG/ML
25 INJECTION, SOLUTION INTRAMUSCULAR; INTRAVENOUS
Status: DISCONTINUED | OUTPATIENT
Start: 2017-07-03 | End: 2017-07-03 | Stop reason: HOSPADM

## 2017-07-03 RX ORDER — SODIUM CHLORIDE, SODIUM LACTATE, POTASSIUM CHLORIDE, CALCIUM CHLORIDE 600; 310; 30; 20 MG/100ML; MG/100ML; MG/100ML; MG/100ML
1000 INJECTION, SOLUTION INTRAVENOUS CONTINUOUS
Status: DISCONTINUED | OUTPATIENT
Start: 2017-07-03 | End: 2017-07-03 | Stop reason: HOSPADM

## 2017-07-03 RX ORDER — FENTANYL CITRATE 50 UG/ML
INJECTION, SOLUTION INTRAMUSCULAR; INTRAVENOUS AS NEEDED
Status: DISCONTINUED | OUTPATIENT
Start: 2017-07-03 | End: 2017-07-03 | Stop reason: HOSPADM

## 2017-07-03 RX ORDER — MIDAZOLAM HYDROCHLORIDE 1 MG/ML
0.5 INJECTION, SOLUTION INTRAMUSCULAR; INTRAVENOUS
Status: DISCONTINUED | OUTPATIENT
Start: 2017-07-03 | End: 2017-07-03 | Stop reason: HOSPADM

## 2017-07-03 RX ORDER — FENTANYL CITRATE 50 UG/ML
50 INJECTION, SOLUTION INTRAMUSCULAR; INTRAVENOUS AS NEEDED
Status: DISCONTINUED | OUTPATIENT
Start: 2017-07-03 | End: 2017-07-03 | Stop reason: HOSPADM

## 2017-07-03 RX ORDER — ONDANSETRON 2 MG/ML
4 INJECTION INTRAMUSCULAR; INTRAVENOUS AS NEEDED
Status: DISCONTINUED | OUTPATIENT
Start: 2017-07-03 | End: 2017-07-03 | Stop reason: HOSPADM

## 2017-07-03 RX ORDER — LIDOCAINE HYDROCHLORIDE 10 MG/ML
INJECTION INFILTRATION; PERINEURAL AS NEEDED
Status: DISCONTINUED | OUTPATIENT
Start: 2017-07-03 | End: 2017-07-03 | Stop reason: HOSPADM

## 2017-07-03 RX ORDER — ALBUTEROL SULFATE 0.83 MG/ML
2.5 SOLUTION RESPIRATORY (INHALATION) AS NEEDED
Status: DISCONTINUED | OUTPATIENT
Start: 2017-07-03 | End: 2017-07-03 | Stop reason: HOSPADM

## 2017-07-03 RX ORDER — SODIUM CHLORIDE 9 MG/ML
25 INJECTION, SOLUTION INTRAVENOUS CONTINUOUS
Status: DISCONTINUED | OUTPATIENT
Start: 2017-07-03 | End: 2017-07-03 | Stop reason: HOSPADM

## 2017-07-03 RX ORDER — HYDROCODONE BITARTRATE AND ACETAMINOPHEN 5; 325 MG/1; MG/1
1 TABLET ORAL AS NEEDED
Status: DISCONTINUED | OUTPATIENT
Start: 2017-07-03 | End: 2017-07-03 | Stop reason: HOSPADM

## 2017-07-03 RX ORDER — BUPIVACAINE HYDROCHLORIDE 5 MG/ML
INJECTION, SOLUTION EPIDURAL; INTRACAUDAL AS NEEDED
Status: DISCONTINUED | OUTPATIENT
Start: 2017-07-03 | End: 2017-07-03 | Stop reason: HOSPADM

## 2017-07-03 RX ORDER — GLYCOPYRROLATE 0.2 MG/ML
0.2 INJECTION INTRAMUSCULAR; INTRAVENOUS
Status: DISCONTINUED | OUTPATIENT
Start: 2017-07-03 | End: 2017-07-03 | Stop reason: HOSPADM

## 2017-07-03 RX ORDER — HYDROMORPHONE HYDROCHLORIDE 1 MG/ML
0.2 INJECTION, SOLUTION INTRAMUSCULAR; INTRAVENOUS; SUBCUTANEOUS
Status: DISCONTINUED | OUTPATIENT
Start: 2017-07-03 | End: 2017-07-03 | Stop reason: HOSPADM

## 2017-07-03 RX ORDER — ROPIVACAINE HYDROCHLORIDE 5 MG/ML
30 INJECTION, SOLUTION EPIDURAL; INFILTRATION; PERINEURAL AS NEEDED
Status: DISCONTINUED | OUTPATIENT
Start: 2017-07-03 | End: 2017-07-03 | Stop reason: HOSPADM

## 2017-07-03 RX ORDER — LIDOCAINE HYDROCHLORIDE 10 MG/ML
0.1 INJECTION, SOLUTION EPIDURAL; INFILTRATION; INTRACAUDAL; PERINEURAL AS NEEDED
Status: DISCONTINUED | OUTPATIENT
Start: 2017-07-03 | End: 2017-07-03 | Stop reason: HOSPADM

## 2017-07-03 RX ORDER — CEFAZOLIN SODIUM IN 0.9 % NACL 2 G/50 ML
2 INTRAVENOUS SOLUTION, PIGGYBACK (ML) INTRAVENOUS ONCE
Status: COMPLETED | OUTPATIENT
Start: 2017-07-03 | End: 2017-07-03

## 2017-07-03 RX ORDER — OXYCODONE AND ACETAMINOPHEN 5; 325 MG/1; MG/1
1-2 TABLET ORAL
Qty: 40 TAB | Refills: 0 | Status: SHIPPED | OUTPATIENT
Start: 2017-07-03 | End: 2019-10-14

## 2017-07-03 RX ADMIN — FENTANYL CITRATE 25 MCG: 50 INJECTION, SOLUTION INTRAMUSCULAR; INTRAVENOUS at 07:35

## 2017-07-03 RX ADMIN — MIDAZOLAM HYDROCHLORIDE 2 MG: 1 INJECTION, SOLUTION INTRAMUSCULAR; INTRAVENOUS at 07:31

## 2017-07-03 RX ADMIN — CEFAZOLIN 2 G: 1 INJECTION, POWDER, FOR SOLUTION INTRAMUSCULAR; INTRAVENOUS; PARENTERAL at 07:35

## 2017-07-03 RX ADMIN — PROPOFOL 50 MCG/KG/MIN: 10 INJECTION, EMULSION INTRAVENOUS at 07:33

## 2017-07-03 RX ADMIN — ONDANSETRON 4 MG: 2 INJECTION INTRAMUSCULAR; INTRAVENOUS at 07:49

## 2017-07-03 RX ADMIN — PROPOFOL 20 MG: 10 INJECTION, EMULSION INTRAVENOUS at 07:37

## 2017-07-03 RX ADMIN — SODIUM CHLORIDE, POTASSIUM CHLORIDE, SODIUM LACTATE AND CALCIUM CHLORIDE: 600; 310; 30; 20 INJECTION, SOLUTION INTRAVENOUS at 07:30

## 2017-07-03 NOTE — PROGRESS NOTES
Pt declining PT, order cancelled. Discussed importance of maintaining PWB heel status on the left. Pt has had this procedure before and feels comfortable with recovery expectations. Pt uses a cane and single railing on the steps. Pt does not have any questions at this time.      Yazmin Washburn PT, DPT

## 2017-07-03 NOTE — DISCHARGE INSTRUCTIONS
[address] [facility] Jolanta Rucker, DPM  [address]  [place], [place]. [address]  Phone: [phone]  Fax: [phone]    Post-Op Instructions    Proper care during the postoperative period is an integral part of your surgical treatment program.  It is imperative that these instructions are followed to insure proper healing and to obtain the best results. 1.) Go directly home and keep your feet elevated on the way. 2.) At home, elevate your feet 6 inches above hip level by supporting feet & legs with pillows. 3.) Apply an ice bag covered with a towel just above but not on the operative site for 30 minutes out of each hour for the first 48 hours. 4.) Limited swelling is expected. Occasionally, the skin may take on a bruised appearance. There is no cause for alarm. 5.) Keep your bandages/cast clean and dry. Do NOT remove the bandages or inspect the wound. A small amount of blood on the bandage is normal.    6.) Have prescriptions filled and take medication as directed. If medication causes stomach upset, headache, rash, or other abnormal reactions, discontinue use and CALL THE DOCTOR.    7.) Get plenty of rest with the foot elevated. Drink plenty of fluids and eat regular well-balanced meals. 8.) If you have any problems or concerns, you can call the office anytime. There is a doctor on call 24 hours a day.   CALL THE OFFICE IMMEDIATELY if:   -The bandages become overly stained   -Your medications do not stop the discomfort    -You bump or injure the surgical site   -You develop a fever above 100 degrees   -You get your dressings wet    9.) Your activity level is:   _____Weightbearing as tolerated on _________ foot with surgical shoe   __X___Partial weight bearing to ____LEFT______ heel with surgical shoe & crutches   _____Non weight bearing on ___________ foot with crutches    10.) Your return appointment with Dr. Chandana Denson is:      _____Thursday, 7/6/17 @ 9:30am @ [facility]______________________________________________     ______________________________________________________________________    Anesthesia Discharge Instructions    After general anesthesia or intervenous sedation, for 24 hours or while taking prescription Narcotics:  · Limit your activities  · Do not drive or operate hazardous machinery  · If you have not urinated within 8 hours after discharge, please contact your surgeon on call. · Do not make important personal or business decisions  · Do not drink alcoholic beverages    Report the following to your surgeon:  · Excessive pain, swelling, redness or odor of or around the surgical area  · Temperature over 100.5 degrees  · Nausea and vomiting lasting longer than 4 hours or if unable to take medication  · Any signs of decreased circulation or nerve impairment to extremity:  Change in color, persistent numbness, tingling, coldness or increased pain.   · Any questions

## 2017-07-03 NOTE — BRIEF OP NOTE
BRIEF OPERATIVE NOTE    Date of Procedure: 7/3/2017   Preoperative Diagnosis: OSTEOMYELITIS LEFT GREAT TOE  Postoperative Diagnosis: OSTEOMYELITIS LEFT GREAT TOE    Procedure(s):  PARTIAL LEFT GREAT TOE AMPUTATION  Surgeon(s) and Role:     * Nick Chapa DPM - Primary         Assistant Staff:       Surgical Staff:  Circ-1: Дмитрий Jiménez RN  Scrub Tech-1: Krzysztof Lorenzo  Event Time In   Incision Start 9579   Incision Close 0803     Anesthesia: MAC   Estimated Blood Loss: minimal  Specimens:   ID Type Source Tests Collected by Time Destination   1 : left great toe Fresh Foot, left  Nick Chapa DPM 7/3/2017 0738 Pathology      Findings: full thickness necrotic ulcer along tip of toe that probed to bone. No sign of a deeper infection. Pt has a surgical cure.    Complications: none  Implants: * No implants in log *

## 2017-07-03 NOTE — ANESTHESIA PREPROCEDURE EVALUATION
Anesthetic History   No history of anesthetic complications            Review of Systems / Medical History  Patient summary reviewed, nursing notes reviewed and pertinent labs reviewed    Pulmonary  Within defined limits                 Neuro/Psych   Within defined limits           Cardiovascular    Hypertension: well controlled                   GI/Hepatic/Renal     GERD           Endo/Other    Diabetes: well controlled, type 1         Other Findings              Physical Exam    Airway  Mallampati: II  TM Distance: > 6 cm  Neck ROM: normal range of motion   Mouth opening: Normal     Cardiovascular  Regular rate and rhythm,  S1 and S2 normal,  no murmur, click, rub, or gallop             Dental  No notable dental hx       Pulmonary  Breath sounds clear to auscultation               Abdominal  GI exam deferred       Other Findings            Anesthetic Plan    ASA: 3  Anesthesia type: MAC          Induction: Intravenous  Anesthetic plan and risks discussed with: Patient

## 2017-07-03 NOTE — ANESTHESIA POSTPROCEDURE EVALUATION
Post-Anesthesia Evaluation and Assessment    Patient: Bland Castleman MRN: [id number]  SSN: [id number]    YOB: 1940  Age: 69 y.o. Sex: male       Cardiovascular Function/Vital Signs  Visit Vitals    /82    Pulse (!) 55    Temp 36.7 °C (98 °F)    Resp 12    Ht 5' 8\" (1.727 m)    Wt 108.9 kg (240 lb)    SpO2 96%    BMI 36.49 kg/m2       Patient is status post MAC anesthesia for Procedure(s):  PARTIAL LEFT GREAT TOE AMPUTATION. Nausea/Vomiting: None    Postoperative hydration reviewed and adequate. Pain:  Pain Scale 1: Numeric (0 - 10) (07/03/17 0834)  Pain Intensity 1: 0 (07/03/17 0834)   Managed    Neurological Status:   Neuro (WDL): Within Defined Limits (07/03/17 0706)   At baseline    Mental Status and Level of Consciousness: Arousable    Pulmonary Status:   O2 Device: Room air (07/03/17 0834)   Adequate oxygenation and airway patent    Complications related to anesthesia: None    Post-anesthesia assessment completed.  No concerns    Signed By: Karena Zurita MD     July 3, 2017

## 2017-07-03 NOTE — OP NOTES
[address] [place] [facility] [place] [address]   OP NOTE       Name:  Rasta Alonso   MR#:  [id number]   :  1940   Account #:  [de-identified]    Surgery Date:  2017   Date of Adm:  2017       PREOPERATIVE DIAGNOSIS: Osteomyelitis, left great toe. POSTOPERATIVE DIAGNOSIS: Osteomyelitis, left great toe. PROCEDURES PERFORMED: Partial amputation, left great toe. ATTENDING SURGEON: Kelly Alcaraz DPM    ASSISTANT: None. SPECIMENS REMOVED: Bone, soft tissue, left foot. ANESTHESIA: MAC, with 20 mL of 1% lidocaine plain. HEMOSTASIS: Left pneumatic ankle tourniquet set at 250 mmHg for a   total time of 20 minutes. ESTIMATED BLOOD LOSS: Minimal.    MATERIALS USED: Included 3-0 Vicryl, 3-0 nylon. INJECTABLES: Included 10 mL of 0.5% Marcaine plain. COMPLICATIONS: None. INDICATION FOR PROCEDURE: This is a 81-year-old diabetic male   who has been dealing with a full-thickness ulcer along the distal tip of   his left great toe. Recent MRI revealed osteomyelitis of the distal   phalanx in the left great toe. The patient is here today for surgical   intervention. DESCRIPTION OF PROCEDURE: After the patient was properly   identified by myself and my initials were placed on the left foot, he was   brought back to the operating room under mild sedation. Once in the   operating room, he was transferred from the stretcher and placed onto   the operating table in a supine position. Next, a pneumatic ankle   tourniquet was placed on the left ankle and preset to 250 mmHg. Once   we had approval by the anesthesiologist, local anesthesia was   administered in the form of 20 mL of 1% lidocaine plain, which was   used to perform a left Hernandez block. Next, the left foot was then prepped   and draped in the normal aseptic fashion. Once the left foot was exsanguinated using gravity and the tourniquet   was inflated, attention was focused to the left great toe. Using a 15   blade, a fishmouth incision was made at the level of the IPJ of the left   great toe. The distal portion of the left great toe was then disarticulated   at the level of the IPJ using a 15 blade. The tip of the toe was then   passed off to the surgical table to be sent to Pathology. Using a fresh 15 blade, the soft tissue structures along the head of the   proximal phalanx were released, and a sagittal saw then used to   remove the head of the proximal phalanx. The surgical site was then   thoroughly irrigated with normal sterile saline, and deep tissue was   repaired using 3-0 Vicryl, and the skin was repaired using 3-0 nylon in   a simple suture-type fashion. At this time, 10 mL of 0.5% Marcaine plain were injected into the   surgical site for additional postop anesthesia. The foot was then   dressed with Xeroform, 4 x 4 gauze, Kerlix and a 4-inch Ace wrap. The   tourniquet was let down, for a total time of 20 minutes. The patient   tolerated the procedure well and was transferred to the recovery room,   afebrile, vital signs stable and neurovascular status intact to the left   foot. He was given strict instructions and will be partial weightbearing to   the left using a surgical shoe. He was two prescriptions, one for   Bactrim DS a total of 14 tablets with the instruction to take 1 tab by   mouth twice a day until done, and lastly for Percocet 5/325 mg a total   of 48 tablets, and instructed to take 1-2 tabs by mouth every 4 hours   as needed for pain, with no refills. The patient will follow up with me on   Thursday for his first postop visit. Intraoperatively, the infection   appeared to be purely isolated to the distal portion of the left great toe. There was no sign of a deeper infection. The patient has a surgical   cure.         Curley Harris, DPM Sherren Aus / Deana Gosselin   D:  07/03/2017   08:20   T:  07/03/2017   12:24   Job #:  [id number]

## 2017-07-03 NOTE — IP AVS SNAPSHOT
2700 [facility] [address] [place] [address] 
[phone] Patient: Shannon Samano MRN: [id number] VXP:9[birth year]35/[birth year] You are allergic to the following No active allergies Recent Documentation Height Weight BMI Smoking Status 1.727 m 108.9 kg 36.49 kg/m2 Former Smoker Emergency Contacts Name Discharge Info Relation Home Work Mobile [facility] DISCHARGE CAREGIVER [3] Spouse [3] [phone] [facility] DISCHARGE CAREGIVER [3] Daughter [21] [phone] About your hospitalization You were admitted on:  July 3, 2017 You last received care in the:  [address] [place] [address] PACU You were discharged on:  July 3, 2017 Unit phone number:  [phone] Why you were hospitalized Your primary diagnosis was:  Not on File Providers Seen During Your Hospitalizations Provider Role Specialty Primary office phone Esperanza Lewis DPM Attending Provider Podiatry [phone] Your Primary Care Physician (PCP) Primary Care Physician Office Phone Office Fax Ping Melton [phone] Follow-up Information Follow up With Details Comments Contact Info Alvie Riedel, MD   [address] [facility] [address] [facility] 7 [address] [phone] Esperanza Lewis DPM  pt has follow up apt on Thursday the 6th of July [address] [place] [address] 3066 [facility] [address] [place] [address] 
[phone] Current Discharge Medication List  
  
START taking these medications Dose & Instructions Dispensing Information Comments Morning Noon Evening Bedtime  
 oxyCODONE-acetaminophen 5-325 mg per tablet Commonly known as:  PERCOCET Your last dose was: Your next dose is:    
   
   
 Dose:  1-2 Tab Take 1-2 Tabs by mouth every four (4) hours as needed for Pain. Max Daily Amount: 12 Tabs. Quantity:  40 Tab Refills:  0 trimethoprim-sulfamethoxazole 160-800 mg per tablet Commonly known as:  BACTRIM DS, SEPTRA DS Your last dose was: Your next dose is:    
   
   
 Dose:  1 Tab Take 1 Tab by mouth two (2) times a day for 7 days. Take with food. Take until done. Quantity:  14 Tab Refills:  0 CONTINUE these medications which have NOT CHANGED Dose & Instructions Dispensing Information Comments Morning Noon Evening Bedtime  
 atorvastatin 10 mg tablet Commonly known as:  LIPITOR Your last dose was: Your next dose is:    
   
   
 Dose:  10 mg Take 10 mg by mouth nightly. Refills:  0  
     
   
   
   
  
 busPIRone 10 mg tablet Commonly known as:  BUSPAR Your last dose was: Your next dose is:    
   
   
 Dose:  10 mg Take 10 mg by mouth two (2) times a day. Refills:  0  
     
   
   
   
  
 citalopram 40 mg tablet Commonly known as:  Bessie Boot Your last dose was: Your next dose is:    
   
   
 Dose:  40 mg Take 40 mg by mouth every evening. Refills:  0  
     
   
   
   
  
 clopidogrel 75 mg Tab Commonly known as:  PLAVIX Your last dose was: Your next dose is:    
   
   
 Dose:  75 mg Take 75 mg by mouth daily. Refills:  0  
     
   
   
   
  
 esomeprazole 40 mg capsule Commonly known as:  Clallam Manton Your last dose was: Your next dose is:    
   
   
 Dose:  40 mg Take 40 mg by mouth daily. Refills:  0  
     
   
   
   
  
 glipiZIDE 10 mg tablet Commonly known as:  Lawayne Early Your last dose was: Your next dose is:    
   
   
 Dose:  10 mg Take 10 mg by mouth two (2) times a day. Refills:  0 HYDROcodone-acetaminophen 5-325 mg per tablet Commonly known as:  Natalie Briones Your last dose was: Your next dose is:    
   
   
 Dose:  1 Tab Take 1 Tab by mouth every four (4) hours as needed.  Max Daily Amount: 6 Tabs.  
 Quantity:  30 Tab Refills:  0  
     
   
   
   
  
 lisinopril-hydroCHLOROthiazide 20-12.5 mg per tablet Commonly known as:  Franklin Beltrán Your last dose was: Your next dose is:    
   
   
 Dose:  1 Tab Take 1 Tab by mouth daily. Refills:  0  
     
   
   
   
  
 metFORMIN 1,000 mg tablet Commonly known as:  GLUCOPHAGE Your last dose was: Your next dose is:    
   
   
 Dose:  1000 mg Take 1,000 mg by mouth two (2) times daily (with meals). Refills:  0  
     
   
   
   
  
 metoprolol tartrate 25 mg tablet Commonly known as:  LOPRESSOR Your last dose was: Your next dose is:    
   
   
 Dose:  25 mg Take 25 mg by mouth nightly. Refills:  0  
     
   
   
   
  
 pioglitazone 30 mg tablet Commonly known as:  ACTOS Your last dose was: Your next dose is:    
   
   
 Dose:  45 mg Take 45 mg by mouth daily. Refills:  0  
     
   
   
   
  
 pregabalin 150 mg capsule Commonly known as:  Yrn Ledavinaand Your last dose was: Your next dose is:    
   
   
 Dose:  150 mg Take 150 mg by mouth three (3) times daily. Refills:  0  
     
   
   
   
  
 raNITIdine 150 mg tablet Commonly known as:  ZANTAC Your last dose was: Your next dose is:    
   
   
 Dose:  300 mg Take 300 mg by mouth nightly. Refills:  0  
     
   
   
   
  
 traMADol 50 mg tablet Commonly known as:  ULTRAM  
   
Your last dose was: Your next dose is:    
   
   
 Dose:  50 mg Take 50 mg by mouth every six (6) hours as needed for Pain. Refills:  0 Where to Get Your Medications Information on where to get these meds will be given to you by the nurse or doctor. ! Ask your nurse or doctor about these medications  
  oxyCODONE-acetaminophen 5-325 mg per tablet  
 trimethoprim-sulfamethoxazole 160-800 mg per tablet Discharge Instructions [address] Patrick Burgos, PSHANDA Pastor, KINA 
2008 [address] [facility] [address] Eloina Moody [place] [address] Phone: [phone] Fax: [phone] Post-Op Instructions Proper care during the postoperative period is an integral part of your surgical treatment program.  It is imperative that these instructions are followed to insure proper healing and to obtain the best results. 1.) Go directly home and keep your feet elevated on the way. 2.) At home, elevate your feet 6 inches above hip level by supporting feet & legs with pillows. 3.) Apply an ice bag covered with a towel just above but not on the operative site for 30 minutes out of each hour for the first 48 hours. 4.) Limited swelling is expected. Occasionally, the skin may take on a bruised appearance. There is no cause for alarm. 5.) Keep your bandages/cast clean and dry. Do NOT remove the bandages or inspect the wound. A small amount of blood on the bandage is normal. 
 
6.) Have prescriptions filled and take medication as directed. If medication causes stomach upset, headache, rash, or other abnormal reactions, discontinue use and CALL THE DOCTOR. 
 
7.) Get plenty of rest with the foot elevated. Drink plenty of fluids and eat regular well-balanced meals. 8.) If you have any problems or concerns, you can call the office anytime. There is a doctor on call 24 hours a day. CALL THE OFFICE IMMEDIATELY if: 
 -The bandages become overly stained 
 -Your medications do not stop the discomfort  
 -You bump or injure the surgical site 
 -You develop a fever above 100 degrees 
 -You get your dressings wet 
 
9.) Your activity level is: 
 _____Weightbearing as tolerated on _________ foot with surgical shoe 
 __X___Partial weight bearing to ____LEFT______ heel with surgical shoe & crutches 
 _____Non weight bearing on ___________ foot with crutches 10.) Your return appointment with Dr. Ayanna Guthrie is: _____Thursday, 7/6/17 @ 9:30am @ [place] Office______________________________________________  
 
______________________________________________________________________ Anesthesia Discharge Instructions After general anesthesia or intervenous sedation, for 24 hours or while taking prescription Narcotics: · Limit your activities · Do not drive or operate hazardous machinery · If you have not urinated within 8 hours after discharge, please contact your surgeon on call. · Do not make important personal or business decisions · Do not drink alcoholic beverages Report the following to your surgeon: 
· Excessive pain, swelling, redness or odor of or around the surgical area · Temperature over 100.5 degrees · Nausea and vomiting lasting longer than 4 hours or if unable to take medication · Any signs of decreased circulation or nerve impairment to extremity:  Change in color, persistent numbness, tingling, coldness or increased pain. · Any questions Discharge Instructions Attachments/References SULFAMETHOXAZOLE/TRIMETHOPRIM (BY MOUTH) (ENGLISH) MEFS - OXYCODONE/ACETAMINOPHEN (PERCOCET, ROXICET) - (BY MOUTH) (ENGLISH) Discharge Orders None Introducing [facility]! [facility] introduces [email or web address] patient portal. Now you can access parts of your medical record, email your doctor's office, and request medication refills online. 1. In your internet browser, go to [email or web address] 2. Click on the First Time User? Click Here link in the Sign In box. You will see the New Member Sign Up page. 3. Enter your [email or web address] Access Code exactly as it appears below. You will not need to use this code after youve completed the sign-up process. If you do not sign up before the expiration date, you must request a new code. · [email or web address] Access Code: [email or web address] Expires: 7/16/2017 11:48 AM 
 
4.  Enter the last four digits of your Social Security Number (xxxx) and Date of Birth (mm/dd/yyyy) as indicated and click Submit. You will be taken to the next sign-up page. 5. Create a Samfind ID. This will be your Samfind login ID and cannot be changed, so think of one that is secure and easy to remember. 6. Create a Samfind password. You can change your password at any time. 7. Enter your Password Reset Question and Answer. This can be used at a later time if you forget your password. 8. Enter your e-mail address. You will receive e-mail notification when new information is available in [address] 9. Click Sign Up. You can now view and download portions of your medical record. 10. Click the Download Summary menu link to download a portable copy of your medical information. If you have questions, please visit the Frequently Asked Questions section of the Samfind website. Remember, Samfind is NOT to be used for urgent needs. For medical emergencies, dial 911. Now available from your iPhone and Android! General Information Please provide this summary of care documentation to your next provider. Patient Signature:  ____________________________________________________________ Date:  ____________________________________________________________  
  
Tawana Broussard Provider Signature:  ____________________________________________________________ Date:  ____________________________________________________________ More Information Sulfamethoxazole/Trimethoprim (By mouth) Sulfamethoxazole (sul-fa-meth-OX-a-zole), Trimethoprim (trye-METH-oh-prim) Treats or prevents infections. Brand Name(s): Bactrim, Bactrim DS, SMZ-TMP Pediatric, Sulfatrim, Sulfatrim Pediatric There may be other brand names for this medicine. When This Medicine Should Not Be Used: This medicine is not right for everyone. Do not use it if you had an allergic reaction to trimethoprim, sulfamethoxazole, or any sulfa drug.  Do not use this medicine if you are pregnant, if you have anemia caused by low levels of folic acid, or if you have a history of drug-induced thrombocytopenia. How to Use This Medicine:  
Liquid, Tablet · Your doctor will tell you how much medicine to use. Do not use more than directed. · Measure the oral liquid medicine with a marked measuring spoon, oral syringe, or medicine cup. · Drink extra fluids so you will urinate more often and help prevent kidney problems. · Take all of the medicine in your prescription to clear up your infection, even if you feel better after the first few doses. · Missed dose: Take a dose as soon as you remember. If it is almost time for your next dose, wait until then and take a regular dose. Do not take extra medicine to make up for a missed dose. · Store the medicine in a closed container at room temperature, away from heat, moisture, and direct light. Do not freeze the oral liquid. Drugs and Foods to Avoid: Ask your doctor or pharmacist before using any other medicine, including over-the-counter medicines, vitamins, and herbal products. · Some medicines can affect how this medicine works. Tell your doctor if you also use the following:  
¨ amantadine, cyclosporine, digoxin, indomethacin, memantine, methotrexate, phenytoin, pyrimethamine, or warfarin ¨ an ACE inhibitor, diabetes medicine (glipizide, glyburide, metformin, pioglitazone, repaglinide, rosiglitazone), a diuretic (water pill, such as hydrochlorothiazide), or a tricyclic antidepressant Warnings While Using This Medicine: · It is not safe to take this medicine during pregnancy. It could harm an unborn baby. Tell your doctor right away if you become pregnant. · Tell your doctor if you are breastfeeding, or if you have kidney disease, liver disease, diabetes, malabsorption or malnutrition, folate deficiency, porphyria, thyroid problems, or a history of alcoholism.  Tell your doctor if you have asthma or severe allergies, especially if you are allergic to any medicines. It is important for your doctor to know if you have HIV or AIDS, because this medicine might work differently for you. · This medicine may cause a severe allergic reaction. · This medicine may lower the number of platelets in your body, which are necessary for proper blood clotting. This may cause you to bleed or get infections more easily. Talk with your doctor if you have concerns about this. · This medicine can cause diarrhea. Call your doctor if the diarrhea becomes severe, does not stop, or is bloody. Do not take any medicine to stop diarrhea until you have talked to your doctor. Diarrhea can occur 2 months or more after you stop taking this medicine. · Tell any doctor or dentist who treats you that you are using this medicine. This medicine may affect certain medical test results. · Your doctor will do lab tests at regular visits to check on the effects of this medicine. Keep all appointments. · Keep all medicine out of the reach of children. Never share your medicine with anyone. Possible Side Effects While Using This Medicine:  
Call your doctor right away if you notice any of these side effects: · Allergic reaction: Itching or hives, swelling in your face or hands, swelling or tingling in your mouth or throat, chest tightness, trouble breathing · Blistering, peeling, or red skin rash · Dark urine or pale stools, nausea, vomiting, loss of appetite, stomach pain, yellow skin or eyes · Chest pain, cough, or trouble breathing · Confusion, weakness · Muscle twitching · Severe diarrhea, stomach pain, cramps, bloating · Skin rash, purple spots on your skin, or very pale or yellow skin · Sore throat, fever, muscle pain · Uneven heartbeat, numbness or tingling in your hands, feet, or lips · Unusual bleeding, bruising, or weakness If you notice these less serious side effects, talk with your doctor: · Mild nausea, vomiting, or loss of appetite If you notice other side effects that you think are caused by this medicine, tell your doctor. Call your doctor for medical advice about side effects. You may report side effects to FDA at 8-848-FDA-6281 © 2017 [address]  Information is for End User's use only and may not be sold, redistributed or otherwise used for commercial purposes. The above information is an  only. It is not intended as medical advice for individual conditions or treatments. Talk to your doctor, nurse or pharmacist before following any medical regimen to see if it is safe and effective for you. Oxycodone/Acetaminophen (Percocet, Roxicet) - (By mouth) Why this medicine is used:  
Treats pain. This medicine contains a narcotic pain reliever. Contact a nurse or doctor right away if you have: 
· Extreme weakness, shallow breathing, slow heartbeat · Sweating or cold, clammy skin · Skin blisters, rash, or peeling Common side effects: 
· Constipation · Nausea, vomiting · Tiredness © 2017 [address]  Information is for End User's use only and may not be sold, redistributed or otherwise used for commercial purposes.

## 2017-10-23 ENCOUNTER — HOSPITAL ENCOUNTER (OUTPATIENT)
Dept: WOUND CARE | Age: 77
Discharge: HOME | End: 2017-10-23
Payer: MEDICARE

## 2017-10-23 PROCEDURE — 99213 OFFICE O/P EST LOW 20 MIN: CPT

## 2017-10-23 NOTE — PROGRESS NOTES
[address]   174 [facility], [address] [place] [address]   WOUND CARE PROGRESS NOTE       Name:  Fracisco Tran   MR#:  [id number]   :  1940   Account #:  [de-identified]        Date of Adm:  10/23/2017       DATE OF SERVICE: 10/23/2017     TREATING PHYSICIAN: Chano Quintero DPM    SUBJECTIVE: The patient presents today with his wife complaining of   a new wound on the top of his left 2nd toe. States it has been there for   at least the past 4-6 weeks. Denies any trauma. Denies any nausea,   vomiting, fevers, night sweats or chills. States his blood sugar has   been out of control. Admits to eating poorly. PHYSICAL EXAMINATION   VITAL SIGNS: Stable. The patient is afebrile. LEFT FOOT: There is a 0.1 x 0.2 x 0.1 cm wound on the dorsal aspect   of the left 2nd toe along the level of the PIPJ. The wound is granular   with no acute signs of infection. DP and PT pulses are palpable. Protective sensation is diminished. ASSESSMENT   1. Left 2nd toe ulcer. 2. Diabetes, with peripheral neuropathy. PLAN   1. At today's visit, I had a long discussion with the patient and his wife. Clinically, the wound does not look acutely infected; however, the   patient does present complaining of pain from the toe. 2. The patient is to start Aquacel AG and a gauze dressing to the left   2nd toe every day. The patient is to make sure he wears shoes that   have an open space along the toe box in order to avoid pressure on   the toe. 3. A prescription for Keflex 500 mg, a total of 20 tablets, with   instructions to take 1 tab by mouth twice a day with food until done was   given to the patient. 3. The patient to follow up in 1 week.         KINA Fernandez / [facility]   D:  10/23/2017   15:18   T:  10/23/2017   16:09   Job #:  [id number]

## 2017-10-30 ENCOUNTER — HOSPITAL ENCOUNTER (OUTPATIENT)
Dept: WOUND CARE | Age: 77
Discharge: HOME | End: 2017-10-30
Payer: MEDICARE

## 2017-10-30 PROCEDURE — 99213 OFFICE O/P EST LOW 20 MIN: CPT

## 2017-10-30 RX ORDER — INSULIN GLARGINE 100 [IU]/ML
8 INJECTION, SOLUTION SUBCUTANEOUS
COMMUNITY

## 2017-10-31 NOTE — PROGRESS NOTES
[address], [address] [place] [address]   WOUND CARE PROGRESS NOTE       Name:  Kim Olivas   MR#:  [id number]   :  1940   Account #:  [de-identified]        Date of Adm:  10/30/2017       DATE OF SERVICE: 10/30/2017    SUBJECTIVE: The patient presents today with his wife for followup to   a left second toe ulceration. Denies any nausea, vomiting, fevers, night   sweats or chills. Wife states that the patient's blood sugar is up to 480. States he saw his doctor and was just started on Lantus, which he has   not started yet. She states that it is mainly due to the fact that he is   eating very poorly. PHYSICAL EXAMINATION   VITAL SIGNS: Stable. The patient is afebrile. LEFT FOOT EXAM: There is a 0.3 x 0.5 x 0.1 cm wound along the   dorsal aspect of the left second toe. The wound does not appear to be   acutely infected and has a granular base. DP, PT pulses are palpable. Protective sensation is diminished. There is nonpitting edema in the   left lower extremity. ASSESSMENT:   1. Left second toe wound. 2. Diabetes with peripheral neuropathy. PLAN:   1. Clinically, the wound does not appear to be acutely infected;   however, it is slightly larger compared to his last visit. I am also very   concerned about his uncontrolled diabetes. I explained to the patient   and his wife that uncontrolled diabetes in the presence of an ulcer can   lead to worsening problems, i.e., limb-threatening infection, which   could result in the loss of the second toe. 2. The patient is to continue wound care consisting of Aquacel AG   every day. I have instructed the wife to keep a close eye on his blood   sugar and she is going to manage his diet better. 3. The patient to followup with me in 1 week.         KINA Downey Fairly / [facility]   D:  10/30/2017   16:16   T:  10/31/2017   05:05   Job #:  [id number]

## 2017-11-13 ENCOUNTER — HOSPITAL ENCOUNTER (OUTPATIENT)
Dept: WOUND CARE | Age: 77
Discharge: HOME | End: 2017-11-13
Payer: MEDICARE

## 2017-11-13 PROCEDURE — 99213 OFFICE O/P EST LOW 20 MIN: CPT

## 2017-11-13 RX ORDER — LIDOCAINE HYDROCHLORIDE 20 MG/ML
JELLY TOPICAL
Status: COMPLETED | OUTPATIENT
Start: 2017-11-13 | End: 2017-11-13

## 2017-11-13 RX ADMIN — LIDOCAINE HYDROCHLORIDE 5 ML: 20 JELLY TOPICAL at 16:00

## 2017-11-27 ENCOUNTER — HOSPITAL ENCOUNTER (OUTPATIENT)
Dept: WOUND CARE | Age: 77
Discharge: HOME | End: 2017-11-27
Payer: MEDICARE

## 2017-11-27 PROCEDURE — 99213 OFFICE O/P EST LOW 20 MIN: CPT

## 2017-11-28 NOTE — PROGRESS NOTES
[address], [address] [place] [address]   WOUND CARE PROGRESS NOTE       Name:  Fracisco Tran   MR#:  [id number]   :  1940   Account #:  [de-identified]        Date of Adm:  2017       DATE OF SERVICE: 2017     TREATING PHYSICIAN: Chano Quintero DPM    The patient presents today for followup to a left second digital wound. Denies any nausea, vomiting, fevers, night sweats or chills. States his   blood sugars are in the 101 range. The patient's wife is with him today. PHYSICAL EXAMINATION   VITAL SIGNS: Stable. The patient afebrile. LEFT FOOT: The previously noted wound along the dorsal aspect of   the left second toe is healed. No acute signs of infection. Neurovascular status is unchanged. ASSESSMENT   1. Left second toe wound. 2. Diabetes with peripheral neuropathy. PLAN   1. Clinically, the wound is healed. The patient no longer needs wound   care. 2. The patient is to follow up with me as needed in the wound care   center.         KINA Fernandez / [facility]   D:  2017   14:58   T:  2017   20:43   Job #:  [id number]

## 2018-05-07 PROBLEM — L97.523: Status: ACTIVE | Noted: 2018-05-07

## 2018-05-07 PROBLEM — E11.40 DIABETIC NEUROPATHY (HCC): Status: ACTIVE | Noted: 2018-05-07

## 2018-05-14 ENCOUNTER — HOSPITAL ENCOUNTER (OUTPATIENT)
Dept: WOUND CARE | Age: 78
Discharge: HOME | End: 2018-05-14
Payer: MEDICARE

## 2018-05-14 PROCEDURE — 99214 OFFICE O/P EST MOD 30 MIN: CPT

## 2018-05-14 PROCEDURE — 74011000250 HC RX REV CODE- 250: Performed by: PODIATRIST

## 2018-05-14 RX ORDER — LIDOCAINE HYDROCHLORIDE 20 MG/ML
JELLY TOPICAL
Status: COMPLETED | OUTPATIENT
Start: 2018-05-14 | End: 2018-05-14

## 2018-05-14 RX ADMIN — LIDOCAINE HYDROCHLORIDE 5 ML: 20 JELLY TOPICAL at 14:44

## 2018-05-14 NOTE — WOUND CARE
Discharge Condition:Stable  Ambulatory Status:Rollator   Discharge Destination:Home  Transportation: Private Auto  Accompanied by:  Wife

## 2018-05-14 NOTE — PROGRESS NOTES
Lb 1737 CARE PROGRESS NOTE    Keke Llamas  MR#: [id number]  : 1940  ACCOUNT #: [de-identified]   DATE OF SERVICE: 2018    TREATING PHYSICIAN:  [place] Pallas, DPM    The patient was in today for followup to a left 2nd digit wound. States he took 7 days of the antibiotics, but is miserable because it is making him nauseous and not feeling well. VITAL SIGNS:  Stable. The patient is afebrile. LEFT FOOT EXAM:  There is a 0.5 x 1 x 0.3 cm wound along the dorsal aspect of the left 2nd toe. The wound has a granular base. No acute signs of infection. The surrounding edema and erythema have improved compared to last week. DP and PT pulses are palpable. Sensation is absent. IMPRESSION:  1. Left second digit wound. 2.  Diabetes with peripheral neuropathy. PLAN:  1. Clinically, the wound is getting better. The infection has improved. It is okay for the patient to stop any additional antibiotics for now. 2.  Continue Aquacel Ag every day. The patient is to elevate when at rest.  Follow up with me in 1 week.       KINA Denson / Fidelia Bui  D: 2018 15:57     T: 2018 16:06  JOB #: [id number]

## 2018-05-14 NOTE — WOUND CARE
05/14/18 1437   Wound Toe Left   Date First Assessed/Time First Assessed: 05/07/18 1553   POA: Yes  Wound Type: Diabetic  Location: (c) Toe  Orientation: Left   DRESSING STATUS Clean, dry, and intact   DRESSING TYPE Aquacel;Gauze   Wound Length (cm) 0.5 cm   Wound Width (cm) 1 cm   Wound Depth (cm) 0.3   Wound Surface area (cm^2) 0.5 cm^2   Condition of Base Pink;Slough   Drainage Amount  Small    Drainage Color Serosanguinous

## 2018-05-21 ENCOUNTER — HOSPITAL ENCOUNTER (OUTPATIENT)
Dept: WOUND CARE | Age: 78
Discharge: HOME | End: 2018-05-21
Payer: MEDICARE

## 2018-05-21 VITALS
HEART RATE: 60 BPM | TEMPERATURE: 98.6 F | DIASTOLIC BLOOD PRESSURE: 68 MMHG | RESPIRATION RATE: 18 BRPM | SYSTOLIC BLOOD PRESSURE: 148 MMHG

## 2018-05-21 PROCEDURE — 97597 DBRDMT OPN WND 1ST 20 CM/<: CPT

## 2018-05-21 NOTE — PROGRESS NOTES
Lb 1737 CARE PROGRESS NOTE    Mohsen Higginbotham  MR#: [id number]  : 1940  ACCOUNT #: [de-identified]   DATE OF SERVICE: 2018    TREATING PHYSICIAN:  Nikko Alexander DPM.    The patient presents today for followup to a left dorsal second toe wound. He states his wife is doing the wound care every day. He states that his vertigo is back and he is having issues with that. VITAL SIGNS:  Vital signs are stable. The patient is afebrile. LEFT FOOT EXAM:  There is 0.6 x 0.8 x 0.1 cm wound along the dorsal aspect of the left second PIPJ. The central portion is covered with an eschar while the periwound area is granular. There are no acute signs of infection. DP and PT pulses are palpable. Protective sensation is diminished. ASSESSMENT:  1. Left second toe ulcer. 2.  Diabetes with peripheral neuropathy. PLAN:  1. Using a 15 blade, I excisionally debrided the wound down to a bleeding granular base down to and not including subcutaneous tissue. We will continue Aquacel Ag every day. The patient is to continue keeping his blood sugar under control. 2.  He is to follow up with me in 2 weeks.       KINA Still  D: 2018 15:23     T: 2018 16:00  JOB #: [id number]

## 2018-05-21 NOTE — PROGRESS NOTES
Discharge Condition:Stable  Ambulatory Status:Walker  Discharge Destination:Home  Transportation: Private auto  Accompanied by: family

## 2018-06-04 ENCOUNTER — HOSPITAL ENCOUNTER (OUTPATIENT)
Dept: WOUND CARE | Age: 78
Discharge: HOME | End: 2018-06-04
Payer: MEDICARE

## 2018-06-04 VITALS
RESPIRATION RATE: 18 BRPM | HEART RATE: 52 BPM | SYSTOLIC BLOOD PRESSURE: 154 MMHG | TEMPERATURE: 97.8 F | DIASTOLIC BLOOD PRESSURE: 77 MMHG

## 2018-06-04 DIAGNOSIS — E11.49 OTHER DIABETIC NEUROLOGICAL COMPLICATION ASSOCIATED WITH TYPE 2 DIABETES MELLITUS (HCC): ICD-10-CM

## 2018-06-04 DIAGNOSIS — L97.523 TOE ULCER, LEFT, WITH NECROSIS OF MUSCLE (HCC): ICD-10-CM

## 2018-06-04 PROCEDURE — 74011000250 HC RX REV CODE- 250: Performed by: PODIATRIST

## 2018-06-04 PROCEDURE — 99213 OFFICE O/P EST LOW 20 MIN: CPT

## 2018-06-04 RX ORDER — LIDOCAINE HYDROCHLORIDE 20 MG/ML
JELLY TOPICAL
Status: COMPLETED | OUTPATIENT
Start: 2018-06-04 | End: 2018-06-04

## 2018-06-04 RX ADMIN — LIDOCAINE HYDROCHLORIDE 5 ML: 20 JELLY TOPICAL at 14:24

## 2018-06-04 NOTE — PROGRESS NOTES
Lb 1737 CARE PROGRESS NOTE    Eileen Mcgregor  MR#: [id number]  : 1940  ACCOUNT #: [de-identified]   DATE OF SERVICE: 2018    The patient presents today for followup to a left second toe ulceration. Denies any nausea, vomiting, fever, night sweats, chills. States his toe is more painful and appears to be red compared to his last appointment. Denies any fevers, chills. Wife is with him today. PHYSICAL EXAMINATION:  VITAL SIGNS:  Stable. The patient is febrile. LEFT FOOT EXAM:  There is a 0.5 x 1.1 x 0.2 cm wound along the dorsal aspect of the left second toe over the PIPJ area. There is no pus. There is mild erythema in the toe. Protective sensation is diminished. DP, PT pulses are weakly palpable. IMPRESSION:  1. Left second toe ulcer. 2.  Diabetes with peripheral neuropathy. PLAN:  1. Clinically, the wound is slightly wider and the toe is slightly erythematous. Prescription for ciprofloxacin 500 mg, total of 20 tablets, instructed to take 1 tab by mouth twice a day with food until done was given. The patient is to stop the Aquacel and he will start him on Mesalt which will be changed every day. 2.  Patient is to follow up in 1 week.       KINA Purcell / Michelle Salazar  D: 2018 14:47     T: 2018 15:02  JOB #: [id number]

## 2018-06-04 NOTE — WOUND CARE
Discharge Condition:stable  Ambulatory Status:walker  Discharge Destination:home  Transportation: private auto  Accompanied by: spouse

## 2018-06-04 NOTE — WOUND CARE
06/04/18 1417   Wound Toe Left   Date First Assessed/Time First Assessed: 05/07/18 1553   POA: Yes  Wound Type: Diabetic  Location: (c) Toe  Orientation: Left   DRESSING STATUS Removed   DRESSING TYPE Aquacel;4 x 4   Non-Pressure Injury Partial thickness (epider/derm)   Wound Length (cm) 0.5 cm   Wound Width (cm) 1.1 cm   Wound Depth (cm) 0.2   Wound Surface area (cm^2) 0.55 cm^2   Condition of Base Slough   Tissue Type Yellow   Tissue Type Percent Yellow 100   Drainage Amount  Small    Drainage Color Serosanguinous   Wound Odor None   Periwound Skin Condition Erythema, blanchable   Cleansing and Cleansing Agents  Normal saline

## 2018-06-11 ENCOUNTER — HOSPITAL ENCOUNTER (OUTPATIENT)
Dept: WOUND CARE | Age: 78
Discharge: HOME | End: 2018-06-11
Payer: MEDICARE

## 2018-06-11 VITALS
RESPIRATION RATE: 18 BRPM | SYSTOLIC BLOOD PRESSURE: 135 MMHG | DIASTOLIC BLOOD PRESSURE: 60 MMHG | HEART RATE: 57 BPM | TEMPERATURE: 97.8 F

## 2018-06-11 PROCEDURE — 74011000250 HC RX REV CODE- 250: Performed by: PODIATRIST

## 2018-06-11 PROCEDURE — 99214 OFFICE O/P EST MOD 30 MIN: CPT

## 2018-06-11 RX ORDER — LIDOCAINE HYDROCHLORIDE 20 MG/ML
JELLY TOPICAL
Status: COMPLETED | OUTPATIENT
Start: 2018-06-11 | End: 2018-06-11

## 2018-06-11 RX ADMIN — LIDOCAINE HYDROCHLORIDE 5 ML: 20 JELLY TOPICAL at 15:20

## 2018-06-11 NOTE — PROGRESS NOTES
bL 1737 CARE PROGRESS NOTE    Marcial Carreon  MR#: [id number]  : 1940  ACCOUNT #: [de-identified]   DATE OF SERVICE: 2018    TREATING PHYSICIAN:  Ubaldo Chandra MD    SUBJECTIVE:  The patient presents today for followup to a left second toe ulcer. Denies any nausea, vomiting, fevers, night sweats or chills. He states he is taking his antibiotics and handling it appropriately. Denies any ill reactions while taking the ciprofloxacin. States he is doing the wound care every day. States he is still having a lot of pain in the left second toe. PHYSICAL EXAMINATION:    VITAL SIGNS:  Vital signs are stable. The patient is febrile. LEFT FOOT EXAMINATION:  There is a 0.5 x 1 x 0.2 cm wound along the dorsal aspect of the left second toe. The wound has a fibrogranular base and no acute signs of infection. There is no deep probe and there is no obvious exposed bone. Erythema has significantly improved. DT and PT pulse palpable. Protective sensation is diminished. ASSESSMENT:  1. Left second toe ulcer. 2.  Diabetes with peripheral neuropathy. PLAN:  1. Clinically, the wound is stable. We will continue to Mesalt for right now. The patient is instructed to finish his remaining antibiotics. Due to his constant pain,  I will have the patient get an x-ray of his left foot in order to evaluate the toe and make sure there is no underlying bone infection. The patient actually has an appointment to see me later this week for his regular diabetic foot care and he will see me in my regular office at that time. At that time, I will get an x-ray of his left foot. 2.  The patient to follow with me back here at the [address] [facility] in 1 week.       KINA Gillespie  D: 2018 15:35     T: 2018 15:51  JOB #: [id number]

## 2018-06-11 NOTE — WOUND CARE
06/11/18 1520   Wound Toe Left   Date First Assessed/Time First Assessed: 05/07/18 1553   POA: Yes  Wound Type: Diabetic  Location: (c) Toe  Orientation: Left   DRESSING STATUS Removed   DRESSING TYPE 2 x 2   Non-Pressure Injury Partial thickness (epider/derm)   Wound Length (cm) 0.5 cm   Wound Width (cm) 1 cm   Wound Depth (cm) 0.2   Wound Surface area (cm^2) 0.5 cm^2   Condition of Base Slough   Tissue Type Yellow   Tissue Type Percent Yellow 100   Drainage Amount  Small    Drainage Color Serosanguinous   Wound Odor None   Periwound Skin Condition Intact   Cleansing and Cleansing Agents  Normal saline

## 2018-06-11 NOTE — WOUND CARE
Discharge Condition:stable  Ambulatory Status:cane  Discharge Destination:home  Transportation: private auto  Accompanied by: self

## 2018-06-18 ENCOUNTER — HOSPITAL ENCOUNTER (OUTPATIENT)
Dept: WOUND CARE | Age: 78
Discharge: HOME | End: 2018-06-18
Payer: MEDICARE

## 2018-06-18 VITALS
SYSTOLIC BLOOD PRESSURE: 149 MMHG | TEMPERATURE: 97.8 F | DIASTOLIC BLOOD PRESSURE: 77 MMHG | RESPIRATION RATE: 16 BRPM | HEART RATE: 51 BPM

## 2018-06-18 PROCEDURE — 99214 OFFICE O/P EST MOD 30 MIN: CPT

## 2018-06-18 PROCEDURE — 74011000250 HC RX REV CODE- 250: Performed by: PODIATRIST

## 2018-06-18 RX ORDER — LIDOCAINE HYDROCHLORIDE 20 MG/ML
JELLY TOPICAL
Status: COMPLETED | OUTPATIENT
Start: 2018-06-18 | End: 2018-06-18

## 2018-06-18 RX ADMIN — LIDOCAINE HYDROCHLORIDE 5 ML: 20 JELLY TOPICAL at 13:47

## 2018-06-18 NOTE — PROGRESS NOTES
[address]   WOUND CARE PROGRESS NOTE    Keke Llamas  MR#: [id number]  : 1940  ACCOUNT #: [de-identified]   DATE OF SERVICE: 2018    TREATING PHYSICIAN:  [place] Pallas, DPM     SUBJECTIVE:  The patient presents today for followup to a left second toe ulceration. Denies any nausea, vomiting, fevers, night sweats, chills. States his cardiologist, Dr. Joie Owens, ordered a CAT scan of his left leg and foot to look at the circulation and was told that he has small vessel disease in his left foot. OBJECTIVE:   VITAL SIGNS:  Stable. Patient afebrile. EXTREMITIES:  Left foot:  There is a 0.4 x 0.8 x 0.2 cm wound along the dorsal aspect of the left second toe. There is no acute sign of infection. Protective sensation is diminished. DP and PT pulses are nonpalpable. The foot is cool to touch. ASSESSMENT:  1. Left second toe ulceration. 2.  Diabetes with peripheral neuropathy and peripheral vascular disease. PLAN:  1.  I had a long discussion with the patient this afternoon. Clinically, his toe wound is stable but now that we know his circulation is diminished going into the toes, I explained to him that it is going to be very difficult to treat the wound and heal the wound in this type of environment. I will reach out to Dr. Joie Owens' office to speak with Dr. Brandon Padilla about potential options, if any, to improve the circulation if possible. In the meantime, the patient is to continue the current wound care consisting of Mesalt and a dry dressing to the second toe every day. 2.  Patient to follow up with me in 2 weeks.       KINA Denson / Fidelia Bui  D: 2018 14:03     T: 2018 14:32  JOB #: [id number]

## 2018-06-18 NOTE — WOUND CARE
Discharge Condition:Stable  Ambulatory Status:Ambulatory   Discharge Destination:Home  Transportation: Private Auto   Accompanied by: Self

## 2018-06-18 NOTE — WOUND CARE
06/18/18 1342   Wound Toe Left   Date First Assessed/Time First Assessed: 05/07/18 1553   POA: Yes  Wound Type: Diabetic  Location: (c) Toe  Orientation: Left   Wound Length (cm) 0.4 cm   Wound Width (cm) 0.8 cm   Wound Depth (cm) 0.2   Wound Surface area (cm^2) 0.32 cm^2   Condition of Base Slough   Tissue Type Yellow   Drainage Amount  Small    Drainage Color Serosanguinous   Wound Odor None   Periwound Skin Condition Intact   Cleansing and Cleansing Agents  Normal saline

## 2018-07-02 ENCOUNTER — HOSPITAL ENCOUNTER (OUTPATIENT)
Dept: WOUND CARE | Age: 78
Discharge: HOME | End: 2018-07-02
Payer: MEDICARE

## 2018-07-02 VITALS
DIASTOLIC BLOOD PRESSURE: 75 MMHG | HEART RATE: 57 BPM | SYSTOLIC BLOOD PRESSURE: 145 MMHG | TEMPERATURE: 98.9 F | RESPIRATION RATE: 16 BRPM

## 2018-07-02 DIAGNOSIS — L97.523 TOE ULCER, LEFT, WITH NECROSIS OF MUSCLE (HCC): ICD-10-CM

## 2018-07-02 DIAGNOSIS — E11.49 OTHER DIABETIC NEUROLOGICAL COMPLICATION ASSOCIATED WITH TYPE 2 DIABETES MELLITUS (HCC): ICD-10-CM

## 2018-07-02 PROCEDURE — 99214 OFFICE O/P EST MOD 30 MIN: CPT

## 2018-07-02 NOTE — WOUND CARE
07/02/18 1404   Wound Toe Left   Date First Assessed/Time First Assessed: 05/07/18 1553   POA: Yes  Wound Type: Diabetic  Location: (c) Toe  Orientation: Left   DRESSING STATUS Clean, dry, and intact   Wound Length (cm) 0.4 cm   Wound Width (cm) 1 cm   Wound Depth (cm) 0.2   Wound Surface area (cm^2) 0.4 cm^2   Condition of Base Slough   Tissue Type Yellow   Tissue Type Percent Yellow 100   Drainage Amount  Scant

## 2018-07-02 NOTE — PROGRESS NOTES
Lb 1737 CARE PROGRESS NOTE    Abih Hogan  MR#: [id number]  : 1940  ACCOUNT #: [de-identified]   DATE OF SERVICE: 2018    TREATING PHYSICIAN:  Alondra Hilliard DPM    SUBJECTIVE:  Patient presents today for followup to left 2nd toe ulceration. Denies any nausea, vomiting, fevers, night sweats, chills. Vital signs are stable. The patient is afebrile. Left foot exam, there is a 0.4 x 1 x 0.2 cm wound along the dorsal aspect of the left second toe over the PIPJ. The wound has a fibrogranular base. There is no purulent drainage, no malodor, no erythema. Sensation is diminished. DP and PT pulses are nonpalpable. ASSESSMENT:  1. Left second toe ulceration. 2.  Diabetes with peripheral neuropathy and peripheral vascular disease. PLAN:  1.  I had a long discussion with the patient this afternoon. Using a sterile curette, I excisionally debrided the wound. We will start Santyl every day. 2.  The patient has a documented history of small vessel disease. I tried to reach out to his interventional cardiologist 2 weeks ago, but was not able to get in touch with him. As long as the patient has small vessel disease, this will be a very difficult wound to treat. 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 I will try to reach out to the cardiologist again this week and we will go from there. Patient to follow up with me in 1 week.       KINA Bucio  D: 2018 15:10     T: 2018 15:30  JOB #: [id number]

## 2018-07-09 ENCOUNTER — HOSPITAL ENCOUNTER (OUTPATIENT)
Dept: WOUND CARE | Age: 78
Discharge: HOME | End: 2018-07-09
Payer: MEDICARE

## 2018-07-09 VITALS
SYSTOLIC BLOOD PRESSURE: 125 MMHG | TEMPERATURE: 98.3 F | HEART RATE: 56 BPM | RESPIRATION RATE: 16 BRPM | DIASTOLIC BLOOD PRESSURE: 74 MMHG

## 2018-07-09 PROCEDURE — 99214 OFFICE O/P EST MOD 30 MIN: CPT

## 2018-07-09 NOTE — WOUND CARE
07/09/18 1348 Wound Toe Left Date First Assessed/Time First Assessed: 05/07/18 1553   POA: Yes  Wound Type: Diabetic  Location: (c) Toe  Orientation: Left DRESSING STATUS Clean, dry, and intact Wound Length (cm) 0.4 cm Wound Width (cm) 1 cm Wound Depth (cm) 0.2 Wound Surface area (cm^2) 0.4 cm^2 Condition of [facility] Tissue Type Yellow Tissue Type Percent Yellow 100 Drainage Amount  Scant

## 2018-07-09 NOTE — PROGRESS NOTES
[address] [facility]  WOUND CARE PROGRESS NOTE    Brie Aponte  MR#: [id number]  : 1940  ACCOUNT #: [de-identified]   DATE OF SERVICE: 2018    TREATING PHYSICIAN:  Blue Cody DPM    SUBJECTIVE:  Patient presents today for followup to the left second toe ulceration. States his toe is feeling better. He denies any nausea, vomiting, fever. Denies chills. States his wife is doing his wound care every day. OBJECTIVE:    VITAL SIGNS:  Stable. The patient is afebrile. LEFT FOOT EXAM:  There is a 0.4 x 1 x 0.2 cm wound along the dorsal aspect of the left second PIPJ. The wound is a fibrogranular base with no acute signs of infection. DP and PT pulses are nonpalpable. Fixation is diminished. ASSESSMENT:  1. Left second toe ulceration. 2.  Diabetes with peripheral neuropathy and peripheral vascular disease. PLAN:  1. Using a moist gauze, the wound was excisionally debrided and a moderate amount of slough tissue was removed. The patient is to continue using Santyl every day. 2.  He is to follow up with me.       KINA Whiting / Isiah Cerda  D: 2018 14:07     T: 2018 14:29  JOB #: [id number]

## 2018-07-16 ENCOUNTER — HOSPITAL ENCOUNTER (OUTPATIENT)
Dept: WOUND CARE | Age: 78
Discharge: HOME | End: 2018-07-16
Payer: MEDICARE

## 2018-07-16 VITALS
SYSTOLIC BLOOD PRESSURE: 166 MMHG | TEMPERATURE: 98.3 F | HEART RATE: 78 BPM | DIASTOLIC BLOOD PRESSURE: 81 MMHG | RESPIRATION RATE: 16 BRPM

## 2018-07-16 PROCEDURE — 97597 DBRDMT OPN WND 1ST 20 CM/<: CPT

## 2018-07-16 PROCEDURE — 74011000250 HC RX REV CODE- 250: Performed by: PODIATRIST

## 2018-07-16 RX ORDER — LIDOCAINE HYDROCHLORIDE 20 MG/ML
JELLY TOPICAL
Status: COMPLETED | OUTPATIENT
Start: 2018-07-16 | End: 2018-07-16

## 2018-07-16 RX ADMIN — LIDOCAINE HYDROCHLORIDE 5 ML: 20 JELLY TOPICAL at 14:47

## 2018-07-30 ENCOUNTER — HOSPITAL ENCOUNTER (OUTPATIENT)
Dept: WOUND CARE | Age: 78
Discharge: HOME | End: 2018-07-30
Payer: MEDICARE

## 2018-07-30 VITALS
DIASTOLIC BLOOD PRESSURE: 67 MMHG | RESPIRATION RATE: 16 BRPM | HEART RATE: 59 BPM | SYSTOLIC BLOOD PRESSURE: 135 MMHG | TEMPERATURE: 98.6 F

## 2018-07-30 PROCEDURE — 74011000250 HC RX REV CODE- 250: Performed by: PODIATRIST

## 2018-07-30 PROCEDURE — 99214 OFFICE O/P EST MOD 30 MIN: CPT

## 2018-07-30 RX ORDER — LIDOCAINE HYDROCHLORIDE 20 MG/ML
JELLY TOPICAL AS NEEDED
Status: DISCONTINUED | OUTPATIENT
Start: 2018-07-30 | End: 2018-08-03 | Stop reason: HOSPADM

## 2018-07-30 RX ADMIN — LIDOCAINE HYDROCHLORIDE: 20 JELLY TOPICAL at 14:33

## 2018-07-30 NOTE — WOUND CARE
Visit Vitals  /67 (BP 1 Location: Right arm)  Pulse (!) 59  Temp 98.6 °F (37 °C)  Resp 16  
 
 
 07/30/18 1429 Wound Toe Left Date First Assessed/Time First Assessed: 05/07/18 1553   POA: Yes  Wound Type: Diabetic  Location: (c) Toe  Orientation: Left DRESSING STATUS Clean, dry, and intact DRESSING TYPE Gauze Wound Length (cm) 0.4 cm Wound Width (cm) 0.6 cm Wound Depth (cm) 0.1 Wound Surface area (cm^2) 0.24 cm^2 Condition of [facility] Tissue Type Yellow Tissue Type Percent Yellow 100 Drainage Amount  Scant Drainage Color Serosanguinous Wound Odor None Periwound Skin Condition Intact Cleansing and Cleansing Agents  Normal saline

## 2018-07-30 NOTE — PROGRESS NOTES
[address] 
WOUND CARE PROGRESS NOTE Navi Weber 
MR#: [id number] : 1940 ACCOUNT #: [de-identified] DATE OF SERVICE: 2018 TREATING PHYSICIAN:  Shari Aguilar DPM 
 
SUBJECTIVE:  Patient presents today for followup to left second toe ulcer. Denies any nausea, vomiting, fevers, night sweats, chills. PHYSICAL EXAMINATION: 
VITAL SIGNS:  Stable. The patient is afebrile. LEFT FOOT:  There is a 0.4 x 0.6 x 0.1 cm wound along the dorsal aspect of the left second toe around the PIPJ area. The wound has a granular base and no acute signs of infection. The DP and PT pulses are nonpalpable. Protective sensation is diminished. ASSESSMENT: 
1. Left second toe wound. 2.  Diabetes with peripheral neuropathy and peripheral vascular disease. PLAN: 
1. Clinically, the wound appears to be responding well to the Santyl. Using a sterile curette, I excisionally debrided the wound down to but not including subcutaneous tissue. We will continue Santyl daily. Patient is to follow with me in 2 weeks. KINA Figueroa Cap / DEVONTE 
D: 2018 14:48 T: 2018 15:03 JOB #: [id number]

## 2018-08-13 ENCOUNTER — HOSPITAL ENCOUNTER (OUTPATIENT)
Dept: WOUND CARE | Age: 78
Discharge: HOME | End: 2018-08-13
Payer: MEDICARE

## 2018-08-13 VITALS
SYSTOLIC BLOOD PRESSURE: 122 MMHG | HEART RATE: 56 BPM | RESPIRATION RATE: 16 BRPM | DIASTOLIC BLOOD PRESSURE: 70 MMHG | TEMPERATURE: 98.3 F

## 2018-08-13 DIAGNOSIS — E11.49 OTHER DIABETIC NEUROLOGICAL COMPLICATION ASSOCIATED WITH TYPE 2 DIABETES MELLITUS (HCC): ICD-10-CM

## 2018-08-13 DIAGNOSIS — L97.523 TOE ULCER, LEFT, WITH NECROSIS OF MUSCLE (HCC): ICD-10-CM

## 2018-08-13 PROCEDURE — 99214 OFFICE O/P EST MOD 30 MIN: CPT

## 2018-08-13 NOTE — WOUND CARE
08/13/18 1439   Wound Toe Left   Date First Assessed/Time First Assessed: 05/07/18 1553   POA: Yes  Wound Type: Diabetic  Location: (c) Toe  Orientation: Left   DRESSING STATUS Clean, dry, and intact   Wound Length (cm) 0.2 cm   Wound Width (cm) 0.5 cm   Wound Depth (cm) 0.1   Wound Surface area (cm^2) 0.1 cm^2   Change in Wound Size % 86.11   Condition of Base Slough   Drainage Amount  Scant   Drainage Color Serous   Wound Odor None   Periwound Skin Condition Intact   Cleansing and Cleansing Agents  Normal saline

## 2018-08-13 NOTE — PROGRESS NOTES
[address]  WOUND CARE PROGRESS NOTE    Nita Deras  MR#: [id number]  : 1940  ACCOUNT #: [de-identified]   DATE OF SERVICE: 2018    TREATING PHYSICIAN:  Wallace Hair DPM    The patient presents today for followup to the left second toe wound. Denies any nausea, vomiting, fevers, night sweats, or chills. PHYSICAL EXAMINATION:  VITAL SIGNS:  Stable. The patient is afebrile. LEFT FOOT:  There is a 0.2 x 0.5 x 0.1 cm wound along the dorsal aspect of the left second toe. The wound has a hypergranular base. No acute signs of infection. DP and PT pulses are nonpalpable. Sensation is diminished. IMPRESSION:  1. Left second toe ulceration. 2.  Diabetes with peripheral neuropathy and peripheral vascular disease. PLAN:  1. Clinically, the toe wound is gradually healing. There appears to be no sign of infection. 2.  The patient states that he is running low on Santyl and cannot afford to get another tube. States he has enough to last him at least another 2 weeks. Patient to continue Santyl once a day on the second toe for another 2 weeks. I will reevaluate him and may switch him over to [place] at that time. Patient to call if having problems.       KINA Posadas  D: 2018 15:04     T: 2018 15:23  JOB #: [id number]

## 2018-08-27 ENCOUNTER — HOSPITAL ENCOUNTER (OUTPATIENT)
Dept: WOUND CARE | Age: 78
Discharge: HOME | End: 2018-08-27
Payer: MEDICARE

## 2018-08-27 VITALS
HEART RATE: 60 BPM | RESPIRATION RATE: 16 BRPM | DIASTOLIC BLOOD PRESSURE: 74 MMHG | TEMPERATURE: 98.3 F | SYSTOLIC BLOOD PRESSURE: 130 MMHG

## 2018-08-27 PROCEDURE — 74011000250 HC RX REV CODE- 250: Performed by: PODIATRIST

## 2018-08-27 PROCEDURE — 99214 OFFICE O/P EST MOD 30 MIN: CPT

## 2018-08-27 RX ORDER — LIDOCAINE HYDROCHLORIDE 40 MG/ML
SOLUTION TOPICAL
Status: COMPLETED | OUTPATIENT
Start: 2018-08-27 | End: 2018-08-27

## 2018-08-27 RX ADMIN — LIDOCAINE HYDROCHLORIDE 5 ML: 40 SOLUTION TOPICAL at 15:00

## 2018-08-27 NOTE — PROGRESS NOTES
Discharge Condition:Stable  Ambulatory Status:walker  Discharge Destination:Stable  Transportation: Private auto  Accompanied by: Spouse

## 2018-08-27 NOTE — WOUND CARE
08/27/18 1500   Wound Toe Left   Date First Assessed/Time First Assessed: 05/07/18 1553   POA: Yes  Wound Type: Diabetic  Location: (c) Toe  Orientation: Left   DRESSING STATUS Clean, dry, and intact   DRESSING TYPE Dry dressing   Non-Pressure Injury Partial thickness (epider/derm)   Wound Length (cm) 0.2 cm   Wound Width (cm) 0.2 cm   Wound Depth (cm) 0.1   Wound Surface area (cm^2) 0.04 cm^2   Condition of Base Pink   Drainage Amount  Scant   Drainage Color Serous   Wound Odor None   Periwound Skin Condition Intact   Cleansing and Cleansing Agents  Normal saline

## 2018-08-27 NOTE — PROGRESS NOTES
[address] [facility]  WOUND CARE PROGRESS NOTE    Lori Zambrano  MR#: [id number]  : 1940  ACCOUNT #: [de-identified]   DATE OF SERVICE: 2018    TREATING PHYSICIAN:  Waldemar Santacruz DPM    SUBJECTIVE:  Patient presents today for followup to a left second toe ulceration. Denies any nausea, vomiting, fevers, night sweats, chills. PHYSICAL EXAMINATION:  VITAL SIGNS:  Stable. The patient is febrile. LEFT FOOT:  There is a 0.2 x 0.2 x 0.1 cm wound along the dorsal aspect of the left second toe. The wound has a granular base and no acute signs of infection. DP and PT pulses are nonpalpable. Protective sensation is absent. ASSESSMENT:  1. Left second toe wound. 2.  Diabetes with peripheral neuropathy. PLAN:  1. The patient is to continue using Santyl and dry dressing every day. Once he runs out of the [place], the patient states he can no longer afford it, so we will be switching him to [address] [place] [address]. 2.  The patient to follow up with me in 2 weeks.       KINA Fernandez / Leda Graff  D: 2018 15:34     T: 2018 16:02  JOB #: [id number]

## 2018-09-10 ENCOUNTER — HOSPITAL ENCOUNTER (OUTPATIENT)
Dept: WOUND CARE | Age: 78
Discharge: HOME | End: 2018-09-10
Payer: MEDICARE

## 2018-09-10 VITALS
HEART RATE: 50 BPM | DIASTOLIC BLOOD PRESSURE: 59 MMHG | TEMPERATURE: 98 F | RESPIRATION RATE: 16 BRPM | SYSTOLIC BLOOD PRESSURE: 122 MMHG

## 2018-09-10 DIAGNOSIS — L97.523 TOE ULCER, LEFT, WITH NECROSIS OF MUSCLE (HCC): ICD-10-CM

## 2018-09-10 DIAGNOSIS — E11.49 OTHER DIABETIC NEUROLOGICAL COMPLICATION ASSOCIATED WITH TYPE 2 DIABETES MELLITUS (HCC): ICD-10-CM

## 2018-09-10 PROCEDURE — 99214 OFFICE O/P EST MOD 30 MIN: CPT

## 2018-09-10 RX ORDER — LIDOCAINE HYDROCHLORIDE 40 MG/ML
SOLUTION TOPICAL
Status: DISPENSED | OUTPATIENT
Start: 2018-09-10 | End: 2018-09-11

## 2018-09-10 NOTE — PROGRESS NOTES
[address]  
WOUND CARE PROGRESS NOTE Nimisha Gaston 
MR#: [id number] : 1940 ACCOUNT #: [de-identified] DATE OF SERVICE: 09/10/2018 TREATING PHYSICIAN:  Cayden Allred DPM 
 
SUBJECTIVE:  The patient presents today for followup to left 2nd toe wound. Denies any nausea, vomiting, fevers, night sweats or chills. PHYSICAL EXAMINATION: 
VITAL SIGNS:  Stable. The patient febrile. LEFT FOOT:  There is a 0.3 x 0.3 x 0.1 cm wound along the dorsal aspect of the left 2nd toe. The wound has a granular base with mild fibrosis. No acute signs of infection are noted. DP and PT pulses are nonpalpable. Protective sensation is diminished. ASSESSMENT: 
1. Left 2nd toe wound. 2.  Diabetes with peripheral neuropathy and peripheral vascular disease. PLAN: 
1. Clinically, the wound is small, superficial and stable. We will continue Santyl on a daily basis. 2.  The patient to follow up with me in 3 weeks. KINA Shoemaker 
D: 09/10/2018 15:38    
T: 09/10/2018 15:51 JOB #: [id number]

## 2018-09-10 NOTE — WOUND CARE
Visit Vitals  /59 (BP 1 Location: Right arm, BP Patient Position: At rest)  Pulse (!) 50  Temp 98 °F (36.7 °C)  Resp 16  
 
 
 09/10/18 1503 Wound Toe Left Date First Assessed/Time First Assessed: 05/07/18 1553   POA: Yes  Wound Type: Diabetic  Location: (c) Toe  Orientation: Left Wound Length (cm) 0.3 cm Wound Width (cm) 0.3 cm Wound Depth (cm) 0.1 Wound Surface area (cm^2) 0.09 cm^2 Condition of Base [place];Slough Drainage Amount  Scant Drainage Color Serous Wound Odor None Periwound Skin Condition Intact

## 2018-10-01 ENCOUNTER — HOSPITAL ENCOUNTER (OUTPATIENT)
Dept: WOUND CARE | Age: 78
Discharge: HOME | End: 2018-10-01
Payer: MEDICARE

## 2018-10-01 VITALS
HEART RATE: 58 BPM | TEMPERATURE: 98 F | SYSTOLIC BLOOD PRESSURE: 118 MMHG | DIASTOLIC BLOOD PRESSURE: 63 MMHG | RESPIRATION RATE: 16 BRPM

## 2018-10-01 DIAGNOSIS — L97.523 TOE ULCER, LEFT, WITH NECROSIS OF MUSCLE (HCC): ICD-10-CM

## 2018-10-01 PROCEDURE — 97602 WOUND(S) CARE NON-SELECTIVE: CPT

## 2018-10-01 PROCEDURE — 74011000250 HC RX REV CODE- 250: Performed by: PODIATRIST

## 2018-10-01 RX ORDER — LIDOCAINE HYDROCHLORIDE 40 MG/ML
SOLUTION TOPICAL AS NEEDED
Status: DISCONTINUED | OUTPATIENT
Start: 2018-10-01 | End: 2018-10-05 | Stop reason: HOSPADM

## 2018-10-01 RX ADMIN — LIDOCAINE HYDROCHLORIDE: 40 SOLUTION TOPICAL at 14:29

## 2018-10-01 NOTE — PROGRESS NOTES
[address] [facility] 
WOUND CARE PROGRESS NOTE Azra Jones 
MR#: [id number] : 1940 ACCOUNT #: [de-identified] DATE OF SERVICE: 10/01/2018 TREATING PHYSICIAN:  Tyree Avila DPM 
 
SUBJECTIVE:  Patient presents today for followup to a left second digital wound. Denies any nausea, vomiting, fevers, night sweats, chills. States blood sugar is doing well. PHYSICAL EXAMINATION: 
VITAL SIGNS:  Stable. The patient is afebrile. LEFT FOOT:  There is a 0.4 x 0.4 x 0.1 cm wound along the dorsal aspect of the left second toe. The wound has a granular base and no acute signs of infection. DP and PT pulses are nonpalpable. Protective sensation is diminished. ASSESSMENT: 
1. Left second toe wound. 2.  Diabetes with peripheral neuropathy and peripheral vascular disease. PLAN: 
1. The patient's wound is small and stable. There is no acute sign of infection. 2.  Due to the patient's diagnosed small-vessel disease and peripheral vascular disease, this will be a slow wound to heal.  He is to continue Santyl once a day. He is to follow up with me in 2 weeks. KINA Muñoz 
D: 10/01/2018 14:35 T: 10/01/2018 14:53 JOB #: [id number]

## 2018-10-01 NOTE — PROGRESS NOTES
Discharge Condition:Stable Ambulatory Status:walker Discharge Destination:home Transportation: private auto Accompanied by: self

## 2018-10-15 ENCOUNTER — HOSPITAL ENCOUNTER (OUTPATIENT)
Dept: WOUND CARE | Age: 78
Discharge: HOME | End: 2018-10-15
Payer: MEDICARE

## 2018-10-15 VITALS
DIASTOLIC BLOOD PRESSURE: 82 MMHG | SYSTOLIC BLOOD PRESSURE: 153 MMHG | TEMPERATURE: 97.7 F | HEART RATE: 49 BPM | RESPIRATION RATE: 16 BRPM

## 2018-10-15 PROCEDURE — 74011000250 HC RX REV CODE- 250: Performed by: PODIATRIST

## 2018-10-15 PROCEDURE — 99215 OFFICE O/P EST HI 40 MIN: CPT

## 2018-10-15 RX ORDER — LIDOCAINE HYDROCHLORIDE 40 MG/ML
SOLUTION TOPICAL
Status: COMPLETED | OUTPATIENT
Start: 2018-10-15 | End: 2018-10-15

## 2018-10-15 RX ADMIN — LIDOCAINE HYDROCHLORIDE 5 ML: 40 SOLUTION TOPICAL at 15:00

## 2018-10-15 NOTE — WOUND CARE
10/15/18 1410 Wound Toe Left Date First Assessed/Time First Assessed: 05/07/18 1553   POA: Yes  Wound Type: Diabetic  Location: (c) Toe  Orientation: Left DRESSING STATUS Clean, dry, and intact DRESSING TYPE Adhesive wound dressing (Band-Aid) Wound Length (cm) 0.1 cm Wound Width (cm) 0.2 cm Wound Depth (cm) 0.1 Wound Surface area (cm^2) 0.02 cm^2 Change in Wound Size % 97.22 Condition of [facility] Drainage Amount  Scant Drainage Color Serous Wound Odor None Periwound Skin Condition Macerated Cleansing and Cleansing Agents  Normal saline

## 2018-10-15 NOTE — PROGRESS NOTES
[address] 
WOUND CARE PROGRESS NOTE Nano Espinoza 
MR#: [id number] : 1940 ACCOUNT #: [de-identified] DATE OF SERVICE: 10/15/2018 TREATING PHYSICIAN:  Bala Mcgarry DPM 
 
SUBJECTIVE:  The patient presents today for followup to a left second toe ulceration. He denies any nausea, vomiting, fevers, night sweats, or chills. He states his blood sugar was 190 this morning. OBJECTIVE: 
VITAL SIGNS:  Stable. The patient is afebrile. LEFT FOOT:  There is a 0.1 x 0.2 x 0.1 cm wound on the dorsal aspect of the left second toe over the PIPJ area. The wound has a granular base. No acute signs of infection. DP and PT pulses are nonpalpable. Protective sensation is diminished. ASSESSMENT: 
1. Left second toe ulcer. 2.  Diabetes with peripheral neuropathy and peripheral vascular disease. PLAN:  Clinically, the wound is looking very good and it is almost fully healed. We will continue Santyl every day once a day. The patient will follow up with me in 2 weeks. KINA Sheriff 
D: 10/15/2018 14:20 T: 10/15/2018 14:28 
JOB #: [id number]

## 2018-10-24 ENCOUNTER — HOSPITAL ENCOUNTER (OUTPATIENT)
Dept: WOUND CARE | Age: 78
Discharge: HOME | End: 2018-10-24
Payer: MEDICARE

## 2018-10-24 VITALS
TEMPERATURE: 98 F | SYSTOLIC BLOOD PRESSURE: 153 MMHG | RESPIRATION RATE: 16 BRPM | HEART RATE: 53 BPM | DIASTOLIC BLOOD PRESSURE: 75 MMHG

## 2018-10-24 PROCEDURE — 74011000250 HC RX REV CODE- 250: Performed by: OTOLARYNGOLOGY

## 2018-10-24 PROCEDURE — 99215 OFFICE O/P EST HI 40 MIN: CPT

## 2018-10-24 RX ORDER — LIDOCAINE HYDROCHLORIDE 40 MG/ML
SOLUTION TOPICAL
Status: COMPLETED | OUTPATIENT
Start: 2018-10-24 | End: 2018-10-24

## 2018-10-24 RX ADMIN — LIDOCAINE HYDROCHLORIDE: 40 SOLUTION TOPICAL at 09:00

## 2018-10-24 NOTE — WOUND CARE
10/24/18 0815 Wound Toe Left Date First Assessed/Time First Assessed: 05/07/18 1553   POA: Yes  Wound Type: Diabetic  Location: (c) Toe  Orientation: Left DRESSING STATUS Clean, dry, and intact DRESSING TYPE (bandaid) Wound Length (cm) 0.2 cm Wound Width (cm) 0.4 cm Wound Depth (cm) 0.1 Wound Surface area (cm^2) 0.08 cm^2 Change in Wound Size % 88.89 Condition of Base [place];Slough Drainage Amount  Scant Drainage Color Serous Wound Odor None Periwound Skin Condition Macerated Cleansing and Cleansing Agents  Normal saline Wound Heel Left Date First Assessed/Time First Assessed: 10/24/18 0821   POA: Yes  Location: Heel  Orientation: Left Wound Length (cm) 2.6 cm Wound Width (cm) 1.5 cm Wound Depth (cm) 0.2 Wound Surface area (cm^2) 3.9 cm^2 Condition of Base Pink;Slough; Purple Condition of [place] Open Drainage Amount  Moderate Drainage Color Serous Wound Odor None Periwound Skin Condition Macerated Cleansing and Cleansing Agents  Normal saline Visit Vitals /75 Pulse (!) 53 Temp 98 °F (36.7 °C) Resp 16

## 2018-10-24 NOTE — PROGRESS NOTES
[address] [facility] 
WOUND CARE PROGRESS NOTE Misty Lares 
MR#: [id number] : 1940 ACCOUNT #: [de-identified] DATE OF SERVICE: 10/24/2018 HISTORY OF PRESENT ILLNESS:  Patient is a 69-year-old male with diabetes and a history of previous diabetic foot ulcers including left great toe amputation. He has been followed in the clinic for a left second toe ulceration but comes today with his wife saying that he has redeveloped a new heel ulcer over the last 2 days. Diagnoses L97.523, E11.621 and unstageable left heel ulcer L89.620. They state that the heel ulcer has been draining a fair amount and wetting his sock. No fevers, chills or night sweats are noted. They have been using Santyl on the second toe, but have not done anything for this. No heel problem. He is awake and alert today. PHYSICAL EXAMINATION:  Temperature is 98, pulse 53, respirations 16, blood pressure 153/75. The toe ulcer is larger, measuring 0.2 x 0.4 x 0.1 with some maceration around it. The heel ulcer has several open areas, but a lot of macerated skin and the measurements today are 2.6 x 1.5 x 0.2. There is some slight redness to the second toe dorsally and some mild redness of the heel, but no odor. Has excellent dorsalis pedis pulse. He also has hair on his second toe and hair on his legs and has had a satisfactory REGINA a year ago. Even though I do not see any signs of infection, he has had a rapidly progressing pressure problems in the past and as such, I copiously irrigated each wound and took a culture for future reference. We will start washing his foot with Hibiclens medicated soap and will use Santyl on both of these ulcers and some extra 4 x 4's to absorb the drainage to the heel. We have also stated to his wife that if the dressings are very wet and saturated to change them twice a day. He will elevate his leg to try to help reduce some of the fluid.   I did not detect much in the way of any pitting edema today. The patient understands the plan as well as his wife and we have answered all their questions. They understand the severity of this problem, especially with his past amputation. They will see Dr. Antoinette Brown back early next week. He will wear soft slippers and we will reevaluate offloading footwear for him at his next visit. MD BESSY Romero / [place] 
D: 10/24/2018 08:49 T: 10/24/2018 09:26 
JOB #: [id number]

## 2018-10-24 NOTE — PROGRESS NOTES
Problem: Diabetic Ulcer-Treatment Goal: *Improvement of existing diabetic ulcer Outcome: Progressing Towards Goal 
Culture taken of 2nd toe and heel.

## 2018-10-29 ENCOUNTER — HOSPITAL ENCOUNTER (OUTPATIENT)
Dept: WOUND CARE | Age: 78
Discharge: HOME | End: 2018-10-29
Payer: MEDICARE

## 2018-10-29 VITALS
SYSTOLIC BLOOD PRESSURE: 126 MMHG | RESPIRATION RATE: 16 BRPM | DIASTOLIC BLOOD PRESSURE: 64 MMHG | HEART RATE: 57 BPM | TEMPERATURE: 97 F

## 2018-10-29 PROCEDURE — 74011000250 HC RX REV CODE- 250: Performed by: PODIATRIST

## 2018-10-29 PROCEDURE — 99215 OFFICE O/P EST HI 40 MIN: CPT

## 2018-10-29 RX ORDER — LIDOCAINE HYDROCHLORIDE 40 MG/ML
SOLUTION TOPICAL AS NEEDED
Status: DISCONTINUED | OUTPATIENT
Start: 2018-10-29 | End: 2018-11-02 | Stop reason: HOSPADM

## 2018-10-29 RX ORDER — PANTOPRAZOLE SODIUM 40 MG/1
40 TABLET, DELAYED RELEASE ORAL DAILY
COMMUNITY

## 2018-10-29 RX ADMIN — LIDOCAINE HYDROCHLORIDE: 40 SOLUTION TOPICAL at 14:47

## 2018-10-29 NOTE — PROGRESS NOTES
[address] [facility] 
WOUND CARE PROGRESS NOTE Ortega Handy 
MR#: [id number] : 1940 ACCOUNT #: [de-identified] DATE OF SERVICE: 10/29/2018 TREATING PHYSICIAN:  Gilma Martinez DPM 
 
SUBJECTIVE:  Patient presents today for followup to the left third toe ulceration. The patient also complaining of left heel pain. States he developed a wound on the left heel over the weekend. Denies any trauma. Denies any nausea, vomiting, fevers, night sweats, chills. PHYSICAL EXAMINATION: 
VITAL SIGNS:  Stable. The patient is afebrile. LEFT FOOT:  There is a stable 0.2 x 0.2 x 0.1 cm wound on the dorsal aspect of the over the second toe. The wound has a granular base. No acute signs of infection. There is a post plantar heel wound measuring 3 x 4 x 0.1 cm that has a granular base and no acute signs of infection. DP and PT pulses are nonpalpable. Sensation is diminished. ASSESSMENT: 
1. Left third toe ulceration. 2.  Left heel wound. 3.  Diabetes with peripheral neuropathy. 4.  Peripheral vascular disease. PLAN:  Clinically, the left second toe wound is stable. We will continue Santyl on a daily basis. Regarding the left heel wound it looks to be a blister that had ruptured creating the wound itself. The wound does not look acutely infected. The patient was using Santyl on the wound. Instructed to stop the Santyl and switch to Aquacel Ag every day. Instructed the patient to go back into a surgical shoe on the left foot to offload the heel. He will follow up with me in 1 week. KINA Cameron 
D: 10/29/2018 15:01    
T: 10/29/2018 15:58 JOB #: [id number]

## 2018-10-29 NOTE — WOUND CARE
Visit Vitals /64 (BP 1 Location: Right arm, BP Patient Position: At rest) Pulse (!) 57 Temp 97 °F (36.1 °C) Resp 16  
 
 
 10/29/18 1444 Wound Heel Left Date First Assessed/Time First Assessed: 10/24/18 0821   POA: Yes  Location: Heel  Orientation: Left Wound Length (cm) 3 cm Wound Width (cm) 4 cm Wound Depth (cm) 0.1 Wound Surface area (cm^2) 12 cm^2 Change in Wound Size % -207.69 Condition of [place];Slough Drainage Amount  Moderate Drainage Color Serous Wound Odor None Periwound Skin Condition Macerated Cleansing and Cleansing Agents  Normal saline Wound Toe Left Date First Assessed/Time First Assessed: 05/07/18 1553   POA: Yes  Wound Type: Diabetic  Location: (c) Toe  Orientation: Left Wound Length (cm) 0.2 cm Wound Width (cm) 0.2 cm Wound Depth (cm) 0.1 Wound Surface area (cm^2) 0.04 cm^2 Change in Wound Size % 94.44 Condition of [place];Slough Drainage Amount  Scant Drainage Color Serous Wound Odor None Periwound Skin Condition Macerated Cleansing and Cleansing Agents  Normal saline

## 2018-11-05 ENCOUNTER — HOSPITAL ENCOUNTER (OUTPATIENT)
Dept: WOUND CARE | Age: 78
Discharge: HOME | End: 2018-11-05
Payer: MEDICARE

## 2018-11-05 VITALS
HEART RATE: 70 BPM | TEMPERATURE: 98.3 F | DIASTOLIC BLOOD PRESSURE: 63 MMHG | SYSTOLIC BLOOD PRESSURE: 129 MMHG | RESPIRATION RATE: 16 BRPM

## 2018-11-05 DIAGNOSIS — L97.523 TOE ULCER, LEFT, WITH NECROSIS OF MUSCLE (HCC): ICD-10-CM

## 2018-11-05 DIAGNOSIS — E11.49 OTHER DIABETIC NEUROLOGICAL COMPLICATION ASSOCIATED WITH TYPE 2 DIABETES MELLITUS (HCC): ICD-10-CM

## 2018-11-05 PROCEDURE — 99214 OFFICE O/P EST MOD 30 MIN: CPT

## 2018-11-05 PROCEDURE — 99215 OFFICE O/P EST HI 40 MIN: CPT

## 2018-11-05 NOTE — PROGRESS NOTES
[address] 
WOUND CARE PROGRESS NOTE Selvin Spring 
MR#: [id number] : 1940 ACCOUNT #: [de-identified] DATE OF SERVICE: 2018 TREATING PHYSICIAN:  Jacki Mccall DPM 
 
SUBJECTIVE:  Patient presents today for followup 2 left diabetic foot wounds. Denies any nausea, vomiting, fever or chills. States his left second toe is sore. PHYSICAL EXAMINATION: 
VITAL SIGNS:  Stable. The patient is afebrile. LEFT FOOT:  There is a 1.6 x 1.6 x 0.1 cm wound on the plantar aspect of the left heel wound has a granular base. No acute signs of infection. There is also a 0.5 x 0.8 x 0.1 cm wound along the dorsal aspect of the left second toe. The wound itself on the toe is a bit bigger with no acute signs of infection. There is mild localized edema in the toe. DP and PT pulses are nonpalpable. Sensation is diminished. ASSESSMENT: 
1. Left second toe ulceration. 2.  Left heel wound. 3.  Diabetes with peripheral neuropathy and peripheral vascular disease. PLAN: 
1.  I had a long discussion with the patient this afternoon. Clinically, the second toe does not seem to be infected, but I think he may be getting some irritation from the surgical shoe, which we had put him in last week due to the heel wound. 2.  We will stop Santyl on the second toe and switch to Nevaeh. He is to continue using Aquacel on the left heel. 3.  Patient to stop using the surgical shoe for now. He will follow up with me in 2 weeks. KINA Felipe / JOSE 
D: 2018 15:19    
T: 2018 15:35 JOB #: [id number]

## 2018-11-05 NOTE — WOUND CARE
11/05/18 1500 Wound Heel Left Date First Assessed/Time First Assessed: 10/24/18 0821   POA: Yes  Location: Heel  Orientation: Left DRESSING STATUS Clean, dry, and intact DRESSING TYPE Alginate Non-Pressure Injury Partial thickness (epider/derm) Wound Length (cm) 1.6 cm Wound Width (cm) 1.6 cm Wound Depth (cm) 0.1 Wound Surface area (cm^2) 2.56 cm^2 Change in Wound Size % 34.36 Condition of Base Pink Drainage Amount  Scant Drainage Color Serosanguinous Wound Odor None Periwound Skin Condition Intact Cleansing and Cleansing Agents  Normal saline Wound Toe Left Date First Assessed/Time First Assessed: 05/07/18 1553   POA: Yes  Wound Type: Diabetic  Location: (c) Toe  Orientation: Left DRESSING STATUS Clean, dry, and intact DRESSING TYPE Adhesive wound dressing (Band-Aid) Non-Pressure Injury Partial thickness (epider/derm) Wound Length (cm) 0.5 cm Wound Width (cm) 0.8 cm Wound Depth (cm) 0.1 Wound Surface area (cm^2) 0.4 cm^2 Change in Wound Size % 44.44 Condition of Base Sl[place];Granulation Drainage Amount  Scant Drainage Color Serous Wound Odor None Periwound Skin Condition Macerated Cleansing and Cleansing Agents  Normal saline

## 2018-11-07 ENCOUNTER — TELEPHONE (OUTPATIENT)
Dept: WOUND CARE | Age: 78
End: 2018-11-07

## 2018-11-07 NOTE — TELEPHONE ENCOUNTER
[facility] has questions about pts driving status.  SN left VM x2 and is awaiting return call

## 2018-11-19 ENCOUNTER — HOSPITAL ENCOUNTER (OUTPATIENT)
Dept: WOUND CARE | Age: 78
Discharge: HOME | End: 2018-11-19
Payer: MEDICARE

## 2018-11-19 VITALS
HEART RATE: 57 BPM | SYSTOLIC BLOOD PRESSURE: 105 MMHG | TEMPERATURE: 97.8 F | RESPIRATION RATE: 16 BRPM | DIASTOLIC BLOOD PRESSURE: 64 MMHG

## 2018-11-19 PROCEDURE — 99214 OFFICE O/P EST MOD 30 MIN: CPT

## 2018-11-19 PROCEDURE — 74011000250 HC RX REV CODE- 250: Performed by: PODIATRIST

## 2018-11-19 RX ADMIN — Medication: at 16:00

## 2018-11-19 NOTE — WOUND CARE
11/19/18 1504 Wound Toe Left Date First Assessed/Time First Assessed: 05/07/18 1553   POA: Yes  Wound Type: Diabetic  Location: (c) Toe  Orientation: Left DRESSING STATUS Removed Wound Length (cm) 0.7 cm Wound Width (cm) 1.1 cm Wound Depth (cm) 0.2 Wound Surface area (cm^2) 0.77 cm^2 Change in Wound Size % -6.94 Condition of [facility] Drainage Amount  Small Drainage Color Serosanguinous Wound Odor None Periwound Skin Condition Intact Cleansing and Cleansing Agents  Normal saline Wound Heel Left Date First Assessed/Time First Assessed: 10/24/18 0821   POA: Yes  Location: Heel  Orientation: Left DRESSING STATUS Removed Wound Length (cm) 0.8 cm Wound Width (cm) 1 cm Wound Depth (cm) 0.1 Wound Surface area (cm^2) 0.8 cm^2 Change in Wound Size % 79.49 Condition of Base (pale) Condition of [place] Open Drainage Amount  Scant Drainage Color Serous Wound Odor None Periwound Skin Condition Intact Cleansing and Cleansing Agents  Normal saline Visit Vitals /64 Pulse (!) 57 Temp 97.8 °F (36.6 °C) Resp 16

## 2018-11-19 NOTE — PROGRESS NOTES
[address] 
WOUND CARE PROGRESS NOTE Katelynn Peres 
MR#: [id number] : 1940 ACCOUNT #: [de-identified] DATE OF SERVICE: 2018 TREATING PHYSICIAN:  Phil Jones DPM 
 
SUBJECTIVE:  Patient presents today for followup to a left second digital wound and left heel wound. Denies any nausea, vomiting, fevers, night sweats or chills. States his left heel feels a lot better, but states that the left second toe is not looking good. PHYSICAL EXAMINATION: 
VITAL SIGNS:  Stable. The patient is afebrile. LEFT FOOT:  There is a 0.8 x 1 x 0.1 cm wound along the plantar aspect of the left heel. Wound has a granular base. No acute signs of infection. There is a 0.7 x 1.1 x 0.2 cm wound along the dorsal aspect of the left second toe by the PIPJ. There is no purulent drainage, no malodor, no bone exposed. There is mild localized edema to the toe. DP and PT pulses are nonpalpable. Protective sensation is diminished. ASSESSMENT: 
1. Left second toe ulcer. 2.  Left heel wound. 3.  Diabetes and peripheral neuropathy and peripheral vascular disease. PLAN:   
1.  I had a long discussion with the patient this afternoon. Clinically, the left heel is doing very well. We will continue with Aquacel every day. 2.  I am concerned about the appearance of the left second toe ulcer. It is larger compared to when I saw him 2 weeks ago. We will stop the Nevaeh and go back to [place]. The patient is to follow up with me in 1 week. KINA Arora / Carmelo Kwok 
D: 2018 15:22    
T: 2018 18:52 
JOB #: [id number]

## 2018-11-26 ENCOUNTER — HOSPITAL ENCOUNTER (OUTPATIENT)
Dept: WOUND CARE | Age: 78
Discharge: HOME | End: 2018-11-26
Payer: MEDICARE

## 2018-11-26 VITALS
TEMPERATURE: 98.3 F | HEART RATE: 55 BPM | SYSTOLIC BLOOD PRESSURE: 137 MMHG | DIASTOLIC BLOOD PRESSURE: 56 MMHG | RESPIRATION RATE: 16 BRPM

## 2018-11-26 PROCEDURE — 74011000250 HC RX REV CODE- 250: Performed by: PODIATRIST

## 2018-11-26 PROCEDURE — 99214 OFFICE O/P EST MOD 30 MIN: CPT

## 2018-11-26 RX ADMIN — Medication: at 15:13

## 2018-11-26 NOTE — WOUND CARE
Visit Vitals /56 (BP 1 Location: Right arm, BP Patient Position: At rest) Pulse (!) 55 Temp 98.3 °F (36.8 °C) Resp 16  
 
 
 11/26/18 1510 Wound Heel Left Date First Assessed/Time First Assessed: 10/24/18 0821   POA: Yes  Location: Heel  Orientation: Left DRESSING STATUS Clean, dry, and intact DRESSING TYPE Alginate Non-Pressure Injury Partial thickness (epider/derm) Wound Length (cm) 0.5 cm Wound Width (cm) 0.5 cm Wound Depth (cm) 0.1 Wound Surface area (cm^2) 0.25 cm^2 Change in Wound Size % 93.59 Condition of Base Pink Drainage Amount  Scant Drainage Color Serous Wound Odor None Periwound Skin Condition Intact Cleansing and Cleansing Agents  Normal saline Wound Toe Left Date First Assessed/Time First Assessed: 05/07/18 1553   POA: Yes  Wound Type: Diabetic  Location: (c) Toe  Orientation: Left DRESSING STATUS Clean, dry, and intact DRESSING TYPE Gauze Wound Length (cm) 0.5 cm Wound Width (cm) 1 cm Wound Depth (cm) 0.2 Wound Surface area (cm^2) 0.5 cm^2 Change in Wound Size % 30.56 Condition of [facility] Drainage Amount  Small Drainage Color Serosanguinous Wound Odor None Periwound Skin Condition Intact Cleansing and Cleansing Agents  Normal saline

## 2018-11-26 NOTE — PROGRESS NOTES
[address] [facility] 
WOUND CARE PROGRESS NOTE Allegra Boast 
MR#: [id number] : 1940 ACCOUNT #: [de-identified] DATE OF SERVICE: 2018 TREATING PHYSICIAN:  Cornelia Fu DPM 
 
SUBJECTIVE:  Patient presents today for followup to a left second toe ulcer as well as a left heel ulceration. Denies any nausea, vomiting, fevers, night sweats or chills. States his left heel feels great. States his left toe is sore and painful and very sensitive. PHYSICAL EXAMINATION: 
VITAL SIGNS:  Stable. The patient is afebrile. LEFT FOOT:  There is a 0.5 x 1 x 0.2 cm wound along the dorsal aspect of the left second toe over the PIPJ. There is no purulent drainage, no malodor. Mild localized edema is present. There is no exposed bone. There is a 0.5 x 0.5 x 0.1 cm, 100% granular wound on the plantar aspect of the left heel. Wound is not acutely infected. DP and PT pulses are nonpalpable. Protective sensation is diminished. ASSESSMENT: 
1. Left second toe ulcer. 2.  Left heel wound. 3.  Left heel ulcer 4. Diabetes with peripheral neuropathy and peripheral vascular disease. PLAN: 
1. Clinically, the heel is doing very well. We will continue Aquacel to that. 2.  The second toe ulceration looks somewhat better compared to last week; however, I am concerned about the increased pain that the patient is having. We will order an x-ray for the patient to rule out any underlying osteomyelitis. The patient is to continue Santyl daily to this area. 3.  Patient to follow up with me in 1 week. I explained to him that I will review his x-rays and I will notify him ahead of the appointment, especially in case if something abnormal pops up on the study. KINA Gray 
D: 2018 15:45 T: 2018 15:59 
JOB #: [id number]

## 2018-12-03 ENCOUNTER — HOSPITAL ENCOUNTER (OUTPATIENT)
Dept: WOUND CARE | Age: 78
Discharge: HOME | End: 2018-12-03
Payer: MEDICARE

## 2018-12-03 VITALS
SYSTOLIC BLOOD PRESSURE: 140 MMHG | HEART RATE: 66 BPM | RESPIRATION RATE: 18 BRPM | DIASTOLIC BLOOD PRESSURE: 71 MMHG | TEMPERATURE: 98.2 F

## 2018-12-03 PROCEDURE — 99215 OFFICE O/P EST HI 40 MIN: CPT

## 2018-12-03 PROCEDURE — 74011000250 HC RX REV CODE- 250: Performed by: PODIATRIST

## 2018-12-03 RX ADMIN — Medication: at 16:00

## 2018-12-03 NOTE — WOUND CARE
Discharge Condition: stable Ambulatory Status: with walker Discharge Destination:   home Transportation:  Personal car Accompanied by: self

## 2018-12-03 NOTE — PROGRESS NOTES
[address] 
WOUND CARE PROGRESS NOTE Catrachito Roy 
MR#: [id number] : 1940 ACCOUNT #: [de-identified] DATE OF SERVICE: 2018 TREATING PHYSICIAN:  Geronimo Carney DPM 
 
SUBJECTIVE:  The patient presents today for followup to a left second toe ulcer as well as a left heel wound. Denies any nausea, vomiting, fevers, night sweats or chills. PHYSICAL EXAMINATION: 
VITAL SIGNS:  Stable. The patient is afebrile. LEFT FOOT:  There is a 0.4 x 0.4 x 0.1 cm wound along the plantar aspect of the left heel. The wound is healing appropriately and granular in nature. There is no acute sign of infection. There is a 0.7 x 1 x 0.2 cm wound along the dorsal aspect of the left second toe over the PIPJ. The wound has a fibrogranular base. No acute signs of infection. There is mild periwound maceration noted. There is some sensitivity to touch. DP and PT pulses are nonpalpable. ASSESSMENT: 
1. Left second toe ulceration. 2.  Left heel wound. 3.  Diabetes with peripheral neuropathy and peripheral vascular disease. PLAN: 
1.  I had a long discussion with the patient this afternoon. I reviewed the x-ray of his left foot that he got in my office last week and that was found to be normal with no signs of bone infection in the left second toe. 2.  Left heel wound is granulating in appropriately. We will continue Aquacel Ag to this every day. 3.  Patient is to stop using Santyl and will start Iodosorb gel to the left second toe wound every day. I will reevaluate him in 1 week. KINA Aggarwal 
D: 2018 15:36    
T: 2018 15:56 JOB #: [id number]

## 2018-12-03 NOTE — WOUND CARE
12/03/18 1510 Wound Heel Left Date First Assessed/Time First Assessed: 10/24/18 0821   POA: Yes  Location: Heel  Orientation: Left DRESSING STATUS Clean, dry, and intact DRESSING TYPE Aquacel Wound Length (cm) 0.4 cm Wound Width (cm) 0.4 cm Wound Depth (cm) 0.1 Wound Surface area (cm^2) 0.16 cm^2 Change in Wound Size % 95.9 Condition of Base Epithelial[place];Pink Drainage Amount  None Wound Odor None Periwound Skin Condition Intact Cleansing and Cleansing Agents  Normal saline Wound Toe Left Date First Assessed/Time First Assessed: 05/07/18 1553   POA: Yes  Wound Type: Diabetic  Location: (c) Toe  Orientation: Left DRESSING STATUS Clean, dry, and intact DRESSING TYPE Dry dressing Wound Length (cm) 0.7 cm Wound Width (cm) 1 cm Wound Depth (cm) 0.2 Wound Surface area (cm^2) 0.7 cm^2 Change in Wound Size % 2.78 Condition of [facility] Condition of Edges Rolled/curled Drainage Amount  Small Drainage Color Serosanguinous Wound Odor None Periwound Skin Condition Macerated Cleansing and Cleansing Agents  Normal saline Visit Vitals /71 Pulse 66 Temp 98.2 °F (36.8 °C) Resp 18

## 2018-12-17 ENCOUNTER — HOSPITAL ENCOUNTER (OUTPATIENT)
Dept: WOUND CARE | Age: 78
Discharge: HOME | End: 2018-12-17
Payer: MEDICARE

## 2018-12-17 VITALS
SYSTOLIC BLOOD PRESSURE: 154 MMHG | DIASTOLIC BLOOD PRESSURE: 75 MMHG | HEART RATE: 55 BPM | TEMPERATURE: 97.5 F | RESPIRATION RATE: 18 BRPM

## 2018-12-17 PROCEDURE — 99214 OFFICE O/P EST MOD 30 MIN: CPT

## 2018-12-17 NOTE — PROGRESS NOTES
[address]  WOUND CARE PROGRESS NOTE    Misty Lares  MR#: [id number]  : 1940  ACCOUNT #: [de-identified]   DATE OF SERVICE: 2018    TREATING PHYSICIAN:  Elio Kitchen DPM    SUBJECTIVE:  Patient presents today for followup to a left second toe ulcer as well as a left plantar heel ulcer. He states the left second toe is not feeling as sore as it used to be. He denies any nausea, vomiting, fevers, night sweats, or chills. PHYSICAL EXAMINATION:  VITAL SIGNS:  Stable. Patient is afebrile. LEFT FOOT:  There is a 0.2 x 0.2 x 0.1 cm wound along the dorsal aspect of the left second toe. The previously noted maceration has resolved. There is no acute sign of infection. There is a plantar left heel wound measuring 0.7 x 0.4 x 0.2 cm. It has a superficial granular base. No acute signs of infection. DP and PT pulses are nonpalpable. Protective sensation is diminished. ASSESSMENT:  1. Left second toe ulcer. 2.  Left plantar heel ulcer. 3.  Diabetes with peripheral vascular disease and neuropathy. PLAN:  1. Clinically, both wounds are looking well and appear to be improving gradually. We will continue Iodosorb to the left second toe. Patient states that his wife is no longer putting anything on the left heel and just keeping it covered with a dry dressing. I am okay with that and we will continue this for now. 2.  Patient to follow up with me here in 3 weeks.       KINA Sepulveda  D: 2018 14:59     T: 2018 15:09  JOB #: [id number]

## 2019-01-07 ENCOUNTER — HOSPITAL ENCOUNTER (OUTPATIENT)
Dept: WOUND CARE | Age: 79
Discharge: HOME | End: 2019-01-07
Payer: MEDICARE

## 2019-01-07 VITALS — TEMPERATURE: 97.9 F | DIASTOLIC BLOOD PRESSURE: 81 MMHG | SYSTOLIC BLOOD PRESSURE: 169 MMHG | HEART RATE: 66 BPM

## 2019-01-07 DIAGNOSIS — L97.523 TOE ULCER, LEFT, WITH NECROSIS OF MUSCLE (HCC): ICD-10-CM

## 2019-01-07 PROCEDURE — 74011000250 HC RX REV CODE- 250: Performed by: PODIATRIST

## 2019-01-07 PROCEDURE — 99214 OFFICE O/P EST MOD 30 MIN: CPT

## 2019-01-07 RX ADMIN — Medication: at 14:14

## 2019-01-07 NOTE — WOUND CARE
Visit Vitals /81 (BP 1 Location: Right arm, BP Patient Position: Sitting) Pulse 66 Temp 97.9 °F (36.6 °C)  
 
 
 01/07/19 1409 Wound Heel Left Date First Assessed/Time First Assessed: 10/24/18 0821   POA: Yes  Location: Heel  Orientation: Left Wound Length (cm) 0.5 cm Wound Width (cm) 0.8 cm Wound Depth (cm) 0.3 Wound Surface area (cm^2) 0.4 cm^2 Change in Wound Size % 89.74 Condition of [facility] Drainage Amount  Small Drainage Color Serosanguinous Wound Odor None Periwound Skin Condition Intact

## 2019-01-07 NOTE — WOUND CARE
01/07/19 1409 [REMOVED] Wound Heel Left Final Assessment Date/Final Assessment Time: 01/07/19 1435  Date First Assessed/Time First Assessed: 10/24/18 0821   POA: Yes  Location: Heel  Orientation: Left Drainage Amount  Small Drainage Color Serosanguinous Wound Odor None Periwound Skin Condition Intact Wound Toe Left Date First Assessed/Time First Assessed: 05/07/18 1553   POA: Yes  Wound Type: Diabetic  Location: (c) Toe  Orientation: Left Wound Length (cm) 0.5 cm Wound Width (cm) 0.8 cm Wound Depth (cm) 0.3 Wound Surface area (cm^2) 0.4 cm^2 Change in Wound Size % 44.44 Condition of [facility]

## 2019-01-07 NOTE — PROGRESS NOTES
[address]  
WOUND CARE PROGRESS NOTE Ashley Chaves 
MR#: [id number] : 1940 ACCOUNT #: [de-identified] DATE OF SERVICE: 2019 SUBJECTIVE:  The patient presents today for followup to left dorsal second toe wound. Denies any nausea, vomiting, fevers, night sweats, chills. States his wife has been doing the wound care as instructed. PHYSICAL EXAMINATION: 
VITAL SIGNS:  Stable. The patient is afebrile. LEFT FOOT:  There is a less than 1 x 1 cm wound along the dorsal aspect of the left second toe. The wound has a fibrogranular base. No acute signs of infection. DP and PT pulses are nonpalpable. Protective sensation is diminished. ASSESSMENT: 
1. Left second toe ulcer. 2.  Diabetes with peripheral neuropathy and peripheral vascular disease. PLAN: 
1. Clinically, the wound is stable, not acutely infected. We will continue Iodosorb gel daily. 2.  Reiterated to the patient that due to his small vessel disease, healing this wound is going to be extremely difficult. As long as it is stable, we will continue wound care as it is. I explained to him that if this wound leads to a subsequent infection, the patient has a chance to lose the toe. The patient understands. 3.  Follow up in 2 weeks. KINA Nazario 
D: 2019 14:42 T: 2019 15:33 
JOB #: [id number]

## 2019-01-21 ENCOUNTER — HOSPITAL ENCOUNTER (OUTPATIENT)
Dept: WOUND CARE | Age: 79
Discharge: HOME | End: 2019-01-21
Payer: MEDICARE

## 2019-01-21 VITALS
HEART RATE: 73 BPM | DIASTOLIC BLOOD PRESSURE: 83 MMHG | SYSTOLIC BLOOD PRESSURE: 177 MMHG | RESPIRATION RATE: 18 BRPM | TEMPERATURE: 97.8 F

## 2019-01-21 PROCEDURE — 99214 OFFICE O/P EST MOD 30 MIN: CPT

## 2019-01-21 NOTE — PROGRESS NOTES
[address] [facility] 
WOUND CARE PROGRESS NOTE Juan C Sutton 
MR#: [id number] : 1940 ACCOUNT #: [de-identified] DATE OF SERVICE: 2019 TREATING PHYSICIAN:  Mick Hope DPM 
 
SUBJECTIVE:  The patient presents today for followup to a left second digital ulcer. Denies any nausea, vomiting, fevers, night sweats, or chills. States blood sugars have been doing very well. States his toe has been bleeding a lot more than normal.  States has gotten more sore since the middle of last week. Denies any trauma to the toe. OBJECTIVE:   
VITAL SIGNS:  Stable. The patient is afebrile. LEFT FOOT:  There is a slightly larger circular shaped ulcer along the dorsal aspect of the left second PIPJ. The toe itself is swollen and erythematous, more so compared to his last visit. DP and PT pulses are nonpalpable. Protective sensation is diminished. ASSESSMENT: 
1. Left second toe ulceration with cellulitis. 2.  Diabetes with neuropathy and peripheral vascular disease. PLAN: 
1.  I had a long discussion with the patient this afternoon. Clinically, his left second toe appears to be infected and his wound appears to be slightly bigger than last time. I am concerned about an underlying infection. 2.  A prescription for ciprofloxacin 500 mg with the instruction of 1 pill twice a day with food until done for 10 days was given to the patient. He is to start that immediately. I also will send the patient out for x-rays of his left foot. He will tentatively come back in a week to see me. I will call him if the x-rays show any abnormal findings. 3.  The patient is to continue using Iodosorb gel to the toe every day. KINA Alexandre / Christophe John 
D: 2019 13:25    
T: 2019 13:53 JOB #: [id number]

## 2019-01-28 ENCOUNTER — HOSPITAL ENCOUNTER (OUTPATIENT)
Dept: WOUND CARE | Age: 79
Discharge: HOME | End: 2019-01-28
Payer: MEDICARE

## 2019-01-28 VITALS
SYSTOLIC BLOOD PRESSURE: 140 MMHG | DIASTOLIC BLOOD PRESSURE: 62 MMHG | HEART RATE: 69 BPM | RESPIRATION RATE: 16 BRPM | TEMPERATURE: 98.1 F

## 2019-01-28 PROCEDURE — 88311 DECALCIFY TISSUE: CPT

## 2019-01-28 PROCEDURE — 99214 OFFICE O/P EST MOD 30 MIN: CPT

## 2019-01-28 PROCEDURE — 74011000250 HC RX REV CODE- 250: Performed by: PODIATRIST

## 2019-01-28 RX ADMIN — Medication: at 15:00

## 2019-01-28 NOTE — PROGRESS NOTES
Visit Vitals /62 Pulse 69 Temp 98.1 °F (36.7 °C) Resp 16  
 
 
 01/28/19 1419 Wound Toe Left Date First Assessed/Time First Assessed: 05/07/18 1553   POA: Yes  Wound Type: Diabetic  Location: (c) Toe  Orientation: Left Wound Length (cm) 0.8 cm Wound Width (cm) 0.7 cm Wound Depth (cm) 0.1 Wound Surface area (cm^2) 0.56 cm^2 Change in Wound Size % 22.22 Condition of [facility] Drainage Amount  Small Wound Odor None Artist Hazy

## 2019-01-29 NOTE — PROGRESS NOTES
[address] [facility] 
WOUND CARE PROGRESS NOTE Junior Irving 
MR#: [id number] : 1940 ACCOUNT #: [de-identified] DATE OF SERVICE: 2019 TREATING PHYSICIAN:  Yadi Davila DPM 
 
SUBJECTIVE:  The patient presents today for followup to a left second toe ulceration. He states he is currently taking his oral antibiotics. States that he still has swelling in the toe and states that the toe still hurts a great deal.  Denies any nausea, vomiting, fevers, night sweats, chills. PHYSICAL EXAMINATION: 
VITAL SIGNS:  Stable. The patient is afebrile. LEFT FOOT:  There is a 0.8 x 0.7 x 0.1 cm wound along the dorsal aspect of the left second PIPJ. The wound has a granular base  There is a considerable amount of swelling and redness in the toe itself. The toe was painful to touch. Protective sensation is diminished. DP and PT pulses are nonpalpable. ASSESSMENT: 
1. Left second toe ulcer with underlying osteomyelitis. 2.  Left foot cellulitis. 3.  Diabetes with peripheral neuropathy and peripheral vascular disease. PLAN:  I had a long discussion with the patient and his wife, who was also present this afternoon. Given the fact that the patient has been on oral antibiotics and the toe was still swollen, erythematous and painful, I am concerned about a deeper infection. The patient had outpatient x-rays performed, which were negative compared to his previous x-rays. I explained to him that the more sensitive test at this point would be an MRI. However, given the clinical  worsening appearance of the toe, I informed the patient my strong belief is that the underlying bone is infected and the patient will require a toe amputation. I reviewed the risks and complications of the surgery with the patient and his wife. The patient is in agreement and would like to move forward with removing the left second toe. We can do this as an outpatient.   I will reach out to my office staff to get the scheduling done as soon as possible. I instructed the patient to start holding his Plavix and aspirin today. The patient will restart these medications postoperatively. KINA Nazario 
D: 01/28/2019 15:05    
T: 01/28/2019 19:18 
JOB #: [id number]

## 2019-02-05 ENCOUNTER — HOSPITAL ENCOUNTER (OUTPATIENT)
Dept: LAB | Age: 79
Discharge: HOME | End: 2019-02-05
Payer: MEDICARE

## 2019-02-05 PROCEDURE — 88305 TISSUE EXAM BY PATHOLOGIST: CPT

## 2019-10-14 ENCOUNTER — HOSPITAL ENCOUNTER (OUTPATIENT)
Dept: WOUND CARE | Age: 79
Discharge: HOME | End: 2019-10-14
Payer: MEDICARE

## 2019-10-14 VITALS
SYSTOLIC BLOOD PRESSURE: 152 MMHG | DIASTOLIC BLOOD PRESSURE: 83 MMHG | TEMPERATURE: 97.7 F | HEART RATE: 58 BPM | RESPIRATION RATE: 16 BRPM

## 2019-10-14 PROCEDURE — 99213 OFFICE O/P EST LOW 20 MIN: CPT

## 2019-10-14 PROCEDURE — 74011000250 HC RX REV CODE- 250: Performed by: PODIATRIST

## 2019-10-14 RX ADMIN — Medication: at 15:00

## 2019-10-14 NOTE — WOUND CARE
Clinic Level of Care Assessment NAME:  Jonnathan Westfall YOB: 1940 GENDER: male MEDICAL RECORD NUMBER:  [id number] DATE:  10/14/2019 Wound Count Document in [facility] Number of Wounds Assessed Points No Wounds/Ulcers []   0 Less than Three Wounds/Ulcers [x]   1  
3-6 Wounds/Ulcers []   2 Greater than 6 Wounds/Ulcers []   3 Ambulation Status Document in Coord/MCKENNA/Mobility tab Status Definition Points Independent Independently able to ambulate. Fully able (without any assistance) to get on/off exam table/chair. [x]   0 Minimal Physical Assistance Requires physical assistance of one person to ambulate and/or position patient to be examined. Includes necessary physical assistance to position lower extremities on/off stool. []   1 Moderate Physical Assistance Requires at least one staff member to physically assist patient in ambulating into treatment room, and on/off exam table. []   2 Full Assistance Requires assistance of at least two staff members to transfer patient into treatment room and/or on/off exam table/chair. \"Total Transfer\". []   3 Dressing Complexity Document in [facility] and Write Appropriate Order Complexity Definition Points No Dressing  []   0 Simple Minimal, simple dressing. i.e. Band-aid, gauze, simple wrap. []   1 Intermediate Moderately complicated requiring licensed personnel to apply i.e. collagen matrix, ointments, gels, alginates. [x]   2 Complex Complicated requiring licensed personnel to apply dressings 6 or more wounds. []   3 Teaching Effort Document in Education Tab Effort Definition Points No Teaching  []   0 Simple Reinforce two or less topics. Document in Education navigator.  []   1 Intermediate Reinforce three to five topics and/or one additional  
new topic. Document in Education navigator. [x]   2 Complex Teach more than one new topic. New patient information  
packet reviewed and/or reinforce more than three topics. Document in Education navigator. HBO initial instruction. []   3 Patient Assessment and Planning Planning Definition Points Simple Multiple System Simple: Simple follow-up with routine assessment and planning. If Discharged, instructions and long term/follow-up care given to patient/caregiver. Discharged, instructions and/or After Visit Summary given to patient/caregiver and instructions completed. [x]   1 Intermediate Multiple System Intermediate: Contact with outside resources; i.e. Telephone calls to home health, [facility]. May include filling out forms and writing letters, arranging transportation, communication with insurance , vendors, etc.  Discharged, instructions and/or After Visit Summary given to patient/caregiver and instructions completed. []   2 Complex Multiple System Complex: Full, comprehensive assessment and planning. Follow the entire navigator under Wound Visit charting filling out each tab which includes OP Adm Database Screening, Education and CarePlan  HBO risk assessment completed. Discharged, instructions and/or After Visit Summary given to patient/caregiver and instructions completed. []   3 Is this the Patient's First Visit to the [address] [place] [address] Road Yes Is this Patient Established @ [facility] 
Yes Clinical Level of Care Points  0-2  Level 1 [] Points  3-5  Level 2 [] Points  6-9  Level 3 [x] Points  10-12  Level 4 [] Points  13-15  Level 5 [] Electronically signed by Candice Agosto RN on 10/14/2019 at 3:04 PM

## 2019-10-14 NOTE — DISCHARGE INSTRUCTIONS
Discharge Instructions for  [address] [facility], [address]  Telephone: [phone]    NAME:  Jose Bravo OF BIRTH:  1940  MEDICAL RECORD NUMBER:  [id number]  DATE:  10/14/2019    Wound Cleansing:   Do not scrub or use excessive force. Cleanse wound prior to applying a clean dressing with:  [x] Normal Saline     Topical Treatments:  Do not apply lotions, creams, or ointments to wound bed unless directed. Dressings:           Wound Location  Left 2nd toe  [] Apply Primary Dressing:  [x] MediHoney   [x] Cover and Secure with:     [x] Gauze [x] Diaz [x] Cover Roll Tape     Avoid contact of tape with skin. [x] Change dressing: [x] Daily         Dietary:  [x] Diet as tolerated: [x] Increase Protein: [] Other:   Activity:  [x] Activity as tolerated:     Return Appointment:  [] Wound and dressing supply provider:   [] ECF or Home Healthcare:  [] Wound Assessment: [] Physician or NP scheduled for Wound Assessment:   [] Return Appointment: With Colton Will  in [facility])  [] Ordered tests:      Electronically signed on 10/14/2019 at 2:49 PM     [address] Information: Should you experience any significant changes in your wound(s) or have questions about your wound care, please contact the [address] [place] [address] at [address] [place] [address] 8:00 am - 4:30. If you need help with your wound outside these hours and cannot wait until we are again available, contact your PCP or go to the hospital emergency room. PLEASE NOTE: IF YOU ARE UNABLE TO OBTAIN WOUND SUPPLIES, CONTINUE TO USE THE SUPPLIES YOU HAVE AVAILABLE UNTIL YOU ARE ABLE TO REACH US. IT IS MOST IMPORTANT TO KEEP THE WOUND COVERED AT ALL TIMES.       Physician Signature:_______________________    Date: ___________ Time:  ____________

## 2019-10-14 NOTE — WOUND CARE
10/14/19 1503 Wound Toe (Comment  which one) Left;Dorsal 3rd toe Date First Assessed/Time First Assessed: 10/14/19 1430   Present on Hospital Admission: Yes  Primary Wound Type: Diabetic Ulcer  Location: Toe (Comment  which one)  Wound Location Orientation: Left;Dorsal  Wound Description: 3rd toe Dressing Type Applied Honey;Gauze;Gauze wrap (marisol) Discharge Condition:stable Ambulatory Status: with walker Discharge Destination: home Transportation:  Personal car Accompanied by:  self

## 2019-10-14 NOTE — WOUND CARE
10/14/19 1431 Wound Toe (Comment  which one) Left;Dorsal 3rd toe Date First Assessed/Time First Assessed: 10/14/19 1430   Present on Hospital Admission: Yes  Primary Wound Type: Diabetic Ulcer  Location: Toe (Comment  which one)  Wound Location Orientation: Left;Dorsal  Wound Description: 3rd toe Dressing Status Clean, dry, and intact Dressing Type Dry dressing Non-staged Wound Description Full thickness Wound Length (cm) 0.4 cm Wound Width (cm) 0.5 cm Wound Depth (cm) 0.1 cm Wound Volume (cm^3) 0.02 cm^3 Condition of [facility] Drainage Amount Scant Drainage Color Purulent Wound Odor None Megan-wound Assessment Intact Cleansing and Cleansing Agents  Normal saline Wound Toe (Comment  which one) Right 1st toe Date First Assessed/Time First Assessed: 10/14/19 1432   Present on Hospital Admission: Yes  Primary Wound Type: Diabetic Ulcer  Location: Toe (Comment  which one)  Wound Location Orientation: Right  Wound Description: 1st toe Dressing Status Clean, dry, and intact Dressing Type Open to air Non-staged Wound Description Partial thickness Wound Length (cm) 0.3 cm Wound Width (cm) 0.3 cm Wound Depth (cm) 0.1 cm Wound Volume (cm^3) 0.01 cm^3 Condition of Base Pink Drainage Amount None Wound Odor None Megan-wound Assessment Intact Cleansing and Cleansing Agents  Normal saline Visit Vitals /83 Pulse (!) 58 Temp 97.7 °F (36.5 °C) Resp 16

## 2019-10-15 NOTE — CONSULTS
[address]    Name:  Monica Knowles  MR#:  [id number]  :  1940  ACCOUNT #:  [de-identified]  DATE OF SERVICE:  10/14/2019    WOUND CARE CONSULTATION    TREATING PHYSICIAN:  Idris Nicholson DPM    HISTORY OF PRESENT ILLNESS:  The patient presents today complaining of a wound on the top of the left third toe. He states this started 1 month ago. Denies any trauma. The patient is well known to me from my office as well as his previous visits here at the wound care center. 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 The patient has a history of diabetes and peripheral neuropathy as well as small vessel disease. PHYSICAL EXAMINATION:  VITAL SIGNS:  Stable. The patient is afebrile. WOUND EXAMINATION:  Left foot exam, there is a 0.4 x 0.5 x 0.1 cm wound along the dorsal aspect of the left third toe. The wound has a fibrogranular base and no acute signs of infection. There is no deep probe. It is over the DIPJ area. DP and PT pulses are nonpalpable. Capillary refill is intact. Protective sensation is absent. ASSESSMENT:  1. Left third toe ulcer. 2.  Diabetes with peripheral neuropathy and peripheral vascular disease. PLAN:  1.  I had a long discussion with the patient this afternoon. Clinically, his wound is stable and not acutely infected; however, his track record for healing digital wounds is very poor. I re-iterated that the last two digital wounds that he had under ending up required a trip to the operating room for either partial or total toe amputation. The patient understands and wants to do whatever is possible to avoid an amputation of this toe. 2.  We will start Medihoney every day.   The patient is to continue using wide nonrestrictive shoes in order to avoid any irritation to the affected area. He is to keep his blood sugar under control. He is to elevate it when at rest.  He is to follow up with me in 1 week.       KINA Go/[email or web address]  D:  10/14/2019 15:01  T:  10/15/2019 6:55  JOB #:  [id number]

## 2019-10-21 ENCOUNTER — HOSPITAL ENCOUNTER (OUTPATIENT)
Dept: WOUND CARE | Age: 79
Discharge: HOME | End: 2019-10-21
Payer: MEDICARE

## 2019-10-21 VITALS
TEMPERATURE: 97.8 F | DIASTOLIC BLOOD PRESSURE: 73 MMHG | RESPIRATION RATE: 18 BRPM | HEART RATE: 54 BPM | SYSTOLIC BLOOD PRESSURE: 150 MMHG

## 2019-10-21 PROCEDURE — 99212 OFFICE O/P EST SF 10 MIN: CPT

## 2019-10-21 PROCEDURE — 74011000250 HC RX REV CODE- 250: Performed by: PODIATRIST

## 2019-10-21 RX ADMIN — Medication: at 15:00

## 2019-10-21 NOTE — WOUND CARE
10/21/19 1501 Wound Toe (Comment  which one) Left;Dorsal 3rd toe Date First Assessed/Time First Assessed: 10/14/19 1430   Present on Hospital Admission: Yes  Primary Wound Type: Diabetic Ulcer  Location: Toe (Comment  which one)  Wound Location Orientation: Left;Dorsal  Wound Description: 3rd toe Dressing Changed Changed/New Dressing Type Applied Honey;Gauze 
(Metahoney) Procedure Tolerated Well Discharge Condition: Stable Pain: 2 Ambulatory Status: Walking and Walker Discharge Destination: Home Transportation: Car Accompanied by: Self Discharge instructions reviewed with Patient and copy or written instructions have been provided. All questions/concerns have been addressed at this time.

## 2019-10-21 NOTE — PROGRESS NOTES
Clinic Level of Care Assessment    NAME:  Lillie Orellana OF BIRTH:  1940 GENDER: male  MEDICAL RECORD NUMBER:  [id number]   DATE:  10/21/2019      Wound Count Document in [address] [place] [address]  Number of Wounds Assessed Points   No Wounds/Ulcers []   0   Less than Three Wounds/Ulcers [x]   1   3-6 Wounds/Ulcers []   2   Greater than 6 Wounds/Ulcers []   3     Ambulation Status Document in Coord/MCKENNA/Mobility tab  Status Definition Points   Independent Independently able to ambulate. Fully able (without any assistance) to get on/off exam table/chair. [x]   0   Minimal Physical Assistance Requires physical assistance of one person to ambulate and/or position patient to be examined. Includes necessary physical assistance to position lower extremities on/off stool. []   1   Moderate Physical Assistance Requires at least one staff member to physically assist patient in ambulating into treatment room, and on/off exam table. []   2   Full Assistance Requires assistance of at least two staff members to transfer patient into treatment room and/or on/off exam table/chair. \"Total Transfer\". []   3     Dressing Complexity Document in LDA and Write Appropriate Order  Complexity Definition Points   No Dressing  []   0   Simple Minimal, simple dressing. i.e. Band-aid, gauze, simple wrap. []   1   Intermediate Moderately complicated requiring licensed personnel to apply i.e. collagen matrix, ointments, gels, alginates. [x]   2   Complex Complicated requiring licensed personnel to apply dressings 6 or more wounds. []   3     Teaching Effort Document in Education Tab   Effort Definition Points   No Teaching  []   0   Simple Reinforce two or less topics. Document in Education navigator. [x]   1   Intermediate Reinforce three to five topics and/or one additional   new topic. Document in Education navigator. []   2   Complex Teach more than one new topic.  New patient information   packet reviewed and/or reinforce more than three topics. Document in Education navigator. HBO initial instruction. []   3       Patient Assessment and Planning  Planning Definition Points   Simple Multiple System Simple: Simple follow-up with routine assessment and planning. If Discharged, instructions and long term/follow-up care given to patient/caregiver. Discharged, instructions and/or After Visit Summary given to patient/caregiver and instructions completed. [x]   1   Intermediate Multiple System Intermediate: Contact with outside resources; i.e. Telephone calls to home health, [facility]. May include filling out forms and writing letters, arranging transportation, communication with insurance , vendors, etc.  Discharged, instructions and/or After Visit Summary given to patient/caregiver and instructions completed. []   2   Complex Multiple System Complex: Full, comprehensive assessment and planning. Follow the entire navigator under Wound Visit charting filling out each tab which includes OP Adm Database Screening, Education and CarePlan  HBO risk assessment completed. Discharged, instructions and/or After Visit Summary given to patient/caregiver and instructions completed.    []   3           Is this the Patient's First Visit to the [address] [place] [address] Road  No      Is this Patient Established @ [facility]  Yes             Clinical Level of Care      Points  0-2  Level 1 []     Points  3-5  Level 2 [x]     Points  6-9  Level 3 []     Points  10-12  Level 4 []     Points  13-15  Level 5 []       Electronically signed by Arma Duverney, RN on 10/21/2019 at 4:49 PM

## 2019-10-21 NOTE — DISCHARGE INSTRUCTIONS
Discharge Instructions for  [address] [facility], [address] [place] [address] Avenue  Telephone: [phone]    NAME:  Bibi Portillo OF BIRTH:  1940  MEDICAL RECORD NUMBER:  [id number]  DATE:  10/21/2019    Wound Cleansing:   Do not scrub or use excessive force. Cleanse wound prior to applying a clean dressing with:  [x] Normal Saline [x] Keep Wound Dry in Shower    [] Wound Cleanser   [] Cleanse wound with Mild Soap & Water  [] May Shower at Discharge   [] Other:          Dressings:           Wound Location left 3rd toe  [x] Apply Primary Dressing:       [x] MediHoney Gel [] Alginate with Silver [] Alginate   [] Collagen [] Collagen with Silver   [] Santyl with Moisten saline gauze     [] Hydrocolloid   [] MediHoney Alginate [] Foam with Silver   [] Foam   [] Hydrofera Blue    [] Mepilex Border    [] Moisten with Saline [] Hydrogel [] Mepitel     [] Bactroban/Mupirocin [] Polysporin  [] Other:    [x] Cover and Secure with:     [x] Gauze [] Diaz [] Kerlix   [] Ace Wrap [x] Cover Roll Tape [] ABD     [] Other:    Avoid contact of tape with skin.   [x] Change dressing: [x] Daily    [] Every Other Day [] Three times per week   [] Once a week [] Do Not Change Dressing   [] Other:    Dietary:  [x] Diet as tolerated: [] Calorie Diabetic Diet: [] No Added Salt:  [x] Increase Protein: [] Other:   Activity:  [x] Activity as tolerated:  [] Patient has no activity restrictions     [] Strict Bedrest: [] Remain off Work:     [] May return to full duty work:                                   [] Return to work with restrictions:             Return Appointment:  [] Wound and dressing supply provider:   [] ECF or Home Healthcare:  [] Wound Assessment: [] Physician or NP scheduled for Wound Assessment:   [x] Return Appointment: With Dr. Morgan Wallis  in  [facility])  [] Ordered tests:      Electronically signed on 10/21/2019 at 2:47 PM     Nellie Pak 281: Should you experience any significant changes in your wound(s) or have questions about your wound care, please contact the [facility] at [address] [place] [address] 8:00 am - 4:30. If you need help with your wound outside these hours and cannot wait until we are again available, contact your PCP or go to the hospital emergency room. PLEASE NOTE: IF YOU ARE UNABLE TO OBTAIN WOUND SUPPLIES, CONTINUE TO USE THE SUPPLIES YOU HAVE AVAILABLE UNTIL YOU ARE ABLE TO REACH US. IT IS MOST IMPORTANT TO KEEP THE WOUND COVERED AT ALL TIMES.       Physician Signature:_______________________    Date: ___________ Time:  ____________

## 2019-10-21 NOTE — WOUND CARE
10/21/19 1424 Wound Toe (Comment  which one) Left;Dorsal 3rd toe Date First Assessed/Time First Assessed: 10/14/19 1430   Present on Hospital Admission: Yes  Primary Wound Type: Diabetic Ulcer  Location: Toe (Comment  which one)  Wound Location Orientation: Left;Dorsal  Wound Description: 3rd toe Dressing Status Intact Dressing Type Aquacel;Gauze Wound Length (cm) 0.3 cm Wound Width (cm) 0.6 cm Wound Depth (cm) 0.1 cm Wound Volume (cm^3) 0.02 cm^3 Condition of Base Eschar Drainage Amount Small Drainage Color Serosanguinous Wound Odor None Megan-wound Assessment Intact Visit Vitals /73 Pulse (!) 54 Temp 97.8 °F (36.6 °C) Resp 18

## 2019-10-22 NOTE — PROGRESS NOTES
[address] [place] [address] NOTE    Name:  John Crawford  MR#:  [id number]  :  1940  ACCOUNT #:  [de-identified]  DATE OF SERVICE:  10/21/2019      TREATING PHYSICIAN:  Gary Ordaz DPM.    SUBJECTIVE:  The patient presents today for followup to left third toe ulceration. Denies any nausea, vomiting, fevers, night sweats, or chills. OBJECTIVE:  VITAL SIGNS:  Stable. The patient is afebrile. WOUND EXAMINATION:  Left foot exam:  There is a 0.3 x 0.6 x 0.1 cm wound along the dorsal aspect of the left third toe. The wound has a dry granular base and no acute signs of infection. DP and PT pulses are nonpalpable. Protective sensation is diminished. ASSESSMENT:  1. Left third toe ulceration. 2.  Diabetes with peripheral neuropathy and peripheral vascular disease. PLAN:  Clinically, the wound is stable. There does not appear to be any sign of an acute infection. We will continue Medihoney every day. The patient is to continue wearing appropriate wide toe box shoe gear. Reiterating to the patient that due to his small vessel disease, it will take a long time for this wound to heal, but if it does get infected, it can easily penetrate the bone, which will result in another toe amputation in his case. The patient understands and wants to do whatever is possible to avoid. The patient is to follow up with me in 2 weeks.       KINA Cuba/[email or web address]/[email or web address]  D:  10/21/2019 14:56  T:  10/22/2019 8:52  JOB #:  [id number]

## 2019-11-04 ENCOUNTER — HOSPITAL ENCOUNTER (OUTPATIENT)
Dept: WOUND CARE | Age: 79
Discharge: HOME | End: 2019-11-04
Payer: MEDICARE

## 2019-11-04 VITALS
TEMPERATURE: 97.7 F | RESPIRATION RATE: 16 BRPM | SYSTOLIC BLOOD PRESSURE: 149 MMHG | HEART RATE: 51 BPM | DIASTOLIC BLOOD PRESSURE: 76 MMHG

## 2019-11-04 PROCEDURE — 99213 OFFICE O/P EST LOW 20 MIN: CPT

## 2019-11-04 PROCEDURE — 74011000250 HC RX REV CODE- 250: Performed by: PODIATRIST

## 2019-11-04 RX ADMIN — Medication: at 15:00

## 2019-11-04 NOTE — DISCHARGE INSTRUCTIONS
Discharge Instructions for  [address] [facility], [address]  Telephone: [phone]    NAME:  Yanet Willis OF BIRTH:  1940  MEDICAL RECORD NUMBER:  [id number]  DATE:  11/4/2019    Wound Cleansing:   Do not scrub or use excessive force. Cleanse wound prior to applying a clean dressing with:  [x] Normal Saline [x] Keep Wound Dry in Shower       Topical Treatments:  Do not apply lotions, creams, or ointments to wound bed unless directed. Dressings:           Wound Location  Left 3rd toe    [] Apply Primary Dressing:  Betadine wet to dry     Avoid contact of tape with skin. [] Change dressing: [x] Daily        Dietary:  [x] Diet as tolerated: [x] Increase Protein:     Activity:  [x] Activity as tolerated:             Return Appointment:  [x] Return Appointment: With Clovis Duque   in patient will call  [] Ordered tests:      Electronically signed on 11/4/2019 at 2:39 PM     [address] [facility] Information: Should you experience any significant changes in your wound(s) or have questions about your wound care, please contact the [address] [place] [address] at [address] [place] [address] 8:00 am - 4:30. If you need help with your wound outside these hours and cannot wait until we are again available, contact your PCP or go to the hospital emergency room. PLEASE NOTE: IF YOU ARE UNABLE TO OBTAIN WOUND SUPPLIES, CONTINUE TO USE THE SUPPLIES YOU HAVE AVAILABLE UNTIL YOU ARE ABLE TO REACH US. IT IS MOST IMPORTANT TO KEEP THE WOUND COVERED AT ALL TIMES.       Physician Signature:_______________________    Date: ___________ Time:  ____________

## 2019-11-04 NOTE — WOUND CARE
11/04/19 1445 Wound Toe (Comment  which one) Left;Dorsal 3rd toe Date First Assessed/Time First Assessed: 10/14/19 1430   Present on Hospital Admission: Yes  Primary Wound Type: Diabetic Ulcer  Location: Toe (Comment  which one)  Wound Location Orientation: Left;Dorsal  Wound Description: 3rd toe Dressing Type Applied (betadine wet to dry) Discharge Condition: Stable Pain: 0 Ambulatory Status: Zia Mike Discharge Destination: Home Transportation: Car Accompanied by: Self Discharge instructions reviewed with Patient and copy or written instructions have been provided. All questions/concerns have been addressed at this time.

## 2019-11-04 NOTE — WOUND CARE
Clinic Level of Care Assessment NAME:  Nettie Mitchell YOB: 1940 GENDER: male MEDICAL RECORD NUMBER:  [id number] DATE:  11/4/2019 Wound Count Document in [facility] Number of Wounds Assessed Points No Wounds/Ulcers []   0 Less than Three Wounds/Ulcers [x]   1  
3-6 Wounds/Ulcers []   2 Greater than 6 Wounds/Ulcers []   3 Ambulation Status Document in Coord/MCKENNA/Mobility tab Status Definition Points Independent Independently able to ambulate. Fully able (without any assistance) to get on/off exam table/chair. [x]   0 Minimal Physical Assistance Requires physical assistance of one person to ambulate and/or position patient to be examined. Includes necessary physical assistance to position lower extremities on/off stool. []   1 Moderate Physical Assistance Requires at least one staff member to physically assist patient in ambulating into treatment room, and on/off exam table. []   2 Full Assistance Requires assistance of at least two staff members to transfer patient into treatment room and/or on/off exam table/chair. \"Total Transfer\". []   3 Dressing Complexity Document in [facility] and Write Appropriate Order Complexity Definition Points No Dressing  []   0 Simple Minimal, simple dressing. i.e. Band-aid, gauze, simple wrap. []   1 Intermediate Moderately complicated requiring licensed personnel to apply i.e. collagen matrix, ointments, gels, alginates. [x]   2 Complex Complicated requiring licensed personnel to apply dressings 6 or more wounds. []   3 Teaching Effort Document in Education Tab Effort Definition Points No Teaching  []   0 Simple Reinforce two or less topics. Document in Education navigator. [x]   1 Intermediate Reinforce three to five topics and/or one additional  
new topic. Document in Education navigator. []   2 Complex Teach more than one new topic. New patient information  
packet reviewed and/or reinforce more than three topics. Document in Education navigator. HBO initial instruction. []   3 Patient Assessment and Planning Planning Definition Points Simple Multiple System Simple: Simple follow-up with routine assessment and planning. If Discharged, instructions and long term/follow-up care given to patient/caregiver. Discharged, instructions and/or After Visit Summary given to patient/caregiver and instructions completed. []   1 Intermediate Multiple System Intermediate: Contact with outside resources; i.e. Telephone calls to home health, [facility]. May include filling out forms and writing letters, arranging transportation, communication with insurance , vendors, etc.  Discharged, instructions and/or After Visit Summary given to patient/caregiver and instructions completed. [x]   2 Complex Multiple System Complex: Full, comprehensive assessment and planning. Follow the entire navigator under Wound Visit charting filling out each tab which includes OP Adm Database Screening, Education and CarePlan  HBO risk assessment completed. Discharged, instructions and/or After Visit Summary given to patient/caregiver and instructions completed. []   3 Is this the Patient's First Visit to the [facility] [address] No 
 
 
Is this Patient Established @ [facility] 
Yes Clinical Level of Care Points  0-2  Level 1 [] Points  3-5  Level 2 [] Points  6-9  Level 3 [x] Points  10-12  Level 4 [] Points  13-15  Level 5 [] Electronically signed by Kadeem Ann RN on 11/4/2019 at 5:10 PM

## 2019-11-04 NOTE — WOUND CARE
11/04/19 1428 Wound Toe (Comment  which one) Left;Dorsal 3rd toe Date First Assessed/Time First Assessed: 10/14/19 1430   Present on Hospital Admission: Yes  Primary Wound Type: Diabetic Ulcer  Location: Toe (Comment  which one)  Wound Location Orientation: Left;Dorsal  Wound Description: 3rd toe Dressing Status Clean, dry, and intact Dressing Type Honey;Gauze Wound Length (cm) 0.9 cm Wound Width (cm) 0.6 cm Wound Depth (cm) 0.1 cm Wound Surface Area (cm^2) 0.54 cm^2 Wound Volume (cm^3) 0.05 cm^3 Condition of Base Eschar Drainage Amount Scant Drainage Color Serosanguinous Wound Odor None Megan-wound Assessment Intact Cleansing and Cleansing Agents  Normal saline Visit Vitals /76 Pulse (!) 51 Temp 97.7 °F (36.5 °C) Resp 16

## 2019-11-05 ENCOUNTER — HOSPITAL ENCOUNTER (OUTPATIENT)
Dept: MRI IMAGING | Age: 79
Discharge: HOME | End: 2019-11-05
Attending: PODIATRIST
Payer: MEDICARE

## 2019-11-05 DIAGNOSIS — M86.9 OSTEOMYELITIS OF THIRD TOE OF LEFT FOOT (HCC): ICD-10-CM

## 2019-11-05 PROCEDURE — 73718 MRI LOWER EXTREMITY W/O DYE: CPT

## 2019-11-05 NOTE — PROGRESS NOTES
[address] [place] [address] PROGRESS NOTE    Name:  Juan Andrade  MR#:  [id number]  :  1940  ACCOUNT #:  [de-identified]  DATE OF SERVICE:  2019      WOUND CARE PROGRESS NOTE    TREATING PHYSICIAN:  Dano Charles DPM    SUBJECTIVE:  The patient presents today for followup to a left third toe diabetic ulceration. Denies any nausea, vomiting, fevers, night sweats, or chills. States his wife has been doing the wound care every day. He states he thinks the toe is looking worse. OBJECTIVE:  VITAL SIGNS:  Stable. The patient is afebrile. WOUND EXAMINATION:  On left foot exam, there is a 0.9 x 0.6 x 0.1 cm wound along the dorsal aspect of the left third toe. The wound has a fibrotic base. It appears larger than before. There is no purulent drainage. No malodor. There is positive pain on palpation. DP and PT pulses are nonpalpable. Protective sensation is absent. ASSESSMENT:  1. Left third toe ulcer with possible osteomyelitis. 2.  Diabetes with peripheral neuropathy and peripheral vascular disease. PLAN:  I had a long discussion with the patient this afternoon. Clinically, his left third toe ulceration appears more fibrotic and larger compared to his last visit. Given his history of small vessel disease as well as diabetic neuropathy and given the fact that he has already lost one and half toes because of poor healing or nonhealing wounds, I am very concerned about the condition of the third toe. We will order an MRI to rule out underlying osteomyelitis. I informed the patient that if the MRI comes back positive, he will end up having to have the third toe amputated. The patient understands. In the mean time, we will stop the Medihoney and switch to Betadine daily. I will call the patient as soon as I get the results of the MRI, which will be faxing the order today for.       KINA Barr/[email or web address]/[email or web address]  D:  2019 14:48  T: 11/05/2019 9:41  JOB #:  [id number]

## 2019-11-11 ENCOUNTER — HOSPITAL ENCOUNTER (OUTPATIENT)
Dept: WOUND CARE | Age: 79
Discharge: HOME | End: 2019-11-11
Payer: MEDICARE

## 2019-11-11 VITALS
TEMPERATURE: 98.2 F | DIASTOLIC BLOOD PRESSURE: 83 MMHG | SYSTOLIC BLOOD PRESSURE: 162 MMHG | HEART RATE: 51 BPM | RESPIRATION RATE: 16 BRPM

## 2019-11-11 PROCEDURE — 99212 OFFICE O/P EST SF 10 MIN: CPT

## 2019-11-11 PROCEDURE — 97597 DBRDMT OPN WND 1ST 20 CM/<: CPT

## 2019-11-11 PROCEDURE — 74011000250 HC RX REV CODE- 250: Performed by: PODIATRIST

## 2019-11-11 RX ADMIN — Medication: at 15:00

## 2019-11-11 NOTE — WOUND CARE
Clinic Level of Care Assessment NAME:  Jonnathan Westfall YOB: 1940 GENDER: male MEDICAL RECORD NUMBER:  [id number] DATE:  11/11/2019 Wound Count Document in [facility] Number of Wounds Assessed Points No Wounds/Ulcers []   0 Less than Three Wounds/Ulcers [x]   1  
3-6 Wounds/Ulcers []   2 Greater than 6 Wounds/Ulcers []   3 Ambulation Status Document in Coord/MCKENNA/Mobility tab Status Definition Points Independent Independently able to ambulate. Fully able (without any assistance) to get on/off exam table/chair. [x]   0 Minimal Physical Assistance Requires physical assistance of one person to ambulate and/or position patient to be examined. Includes necessary physical assistance to position lower extremities on/off stool. []   1 Moderate Physical Assistance Requires at least one staff member to physically assist patient in ambulating into treatment room, and on/off exam table. []   2 Full Assistance Requires assistance of at least two staff members to transfer patient into treatment room and/or on/off exam table/chair. \"Total Transfer\". []   3 Dressing Complexity Document in [facility] and Write Appropriate Order Complexity Definition Points No Dressing  []   0 Simple Minimal, simple dressing. i.e. Band-aid, gauze, simple wrap. []   1 Intermediate Moderately complicated requiring licensed personnel to apply i.e. collagen matrix, ointments, gels, alginates. [x]   2 Complex Complicated requiring licensed personnel to apply dressings 6 or more wounds. []   3 Teaching Effort Document in Education Tab Effort Definition Points No Teaching  []   0 Simple Reinforce two or less topics. Document in Education navigator. [x]   1 Intermediate Reinforce three to five topics and/or one additional  
new topic. Document in Education navigator. []   2 Complex Teach more than one new topic. New patient information  
packet reviewed and/or reinforce more than three topics. Document in Education navigator. HBO initial instruction. []   3 Patient Assessment and Planning Planning Definition Points Simple Multiple System Simple: Simple follow-up with routine assessment and planning. If Discharged, instructions and long term/follow-up care given to patient/caregiver. Discharged, instructions and/or After Visit Summary given to patient/caregiver and instructions completed. []   1 Intermediate Multiple System Intermediate: Contact with outside resources; i.e. Telephone calls to home health, [facility]. May include filling out forms and writing letters, arranging transportation, communication with insurance , vendors, etc.  Discharged, instructions and/or After Visit Summary given to patient/caregiver and instructions completed. []   2 Complex Multiple System Complex: Full, comprehensive assessment and planning. Follow the entire navigator under Wound Visit charting filling out each tab which includes OP Adm Database Screening, Education and CarePlan  HBO risk assessment completed. Discharged, instructions and/or After Visit Summary given to patient/caregiver and instructions completed. []   3 Is this the Patient's First Visit to the [address] [place] [address] Road No 
 
 
Is this Patient Established @ [facility] 
Yes Clinical Level of Care Points  0-2  Level 1 [] Points  3-5  Level 2 [x] Points  6-9  Level 3 [] Points  10-12  Level 4 [] Points  13-15  Level 5 [] Electronically signed by Chiki Gómez RN on 11/11/2019 at 4:26 PM

## 2019-11-11 NOTE — WOUND CARE
11/11/19 1500 Wound Toe (Comment  which one) Left;Dorsal 3rd toe Date First Assessed/Time First Assessed: 10/14/19 1430   Present on Hospital Admission: Yes  Primary Wound Type: Diabetic Ulcer  Location: Toe (Comment  which one)  Wound Location Orientation: Left;Dorsal  Wound Description: 3rd toe Dressing Type Applied Other (Comment) (Betadine wet to dry ) Discharge Condition: Stable Pain: 0 Ambulatory Status: Walking and Walker Discharge Destination: Home Transportation: Car Accompanied by: Self Discharge instructions reviewed with Patient and copy or written instructions have been provided. All questions/concerns have been addressed at this time.

## 2019-11-11 NOTE — DISCHARGE INSTRUCTIONS
Discharge Instructions for  [address] [facility], [address]  Telephone: [phone]    NAME:  Giuliana Osorio OF BIRTH:  1940  MEDICAL RECORD NUMBER:  [id number]  DATE:  11/11/2019    Wound Cleansing:   Do not scrub or use excessive force. Cleanse wound prior to applying a clean dressing with:  [x] Normal Saline [x] Keep Wound Dry in Shower       Topical Treatments:  Do not apply lotions, creams, or ointments to wound bed unless directed. Dressings:           Wound Location  Left 3rd toe    [] Apply Primary Dressing:  Betadine wet to dry     Avoid contact of tape with skin. [] Change dressing: [x] Daily        Dietary:  [x] Diet as tolerated: [x] Increase Protein:     Activity:  [x] Activity as tolerated:             Return Appointment:  [x] Return Appointment: With Radha Puente   in 2 weeks  [] Ordered tests:      Electronically signed on 11/11//2019 at 2:50 PM     Nellie P[address]: Should you experience any significant changes in your wound(s) or have questions about your wound care, please contact the [address] [place] [address] at [address] [place] [address] 8:00 am - 4:30. If you need help with your wound outside these hours and cannot wait until we are again available, contact your PCP or go to the hospital emergency room. PLEASE NOTE: IF YOU ARE UNABLE TO OBTAIN WOUND SUPPLIES, CONTINUE TO USE THE SUPPLIES YOU HAVE AVAILABLE UNTIL YOU ARE ABLE TO REACH US. IT IS MOST IMPORTANT TO KEEP THE WOUND COVERED AT ALL TIMES.   Physician Signature:_______________________    Date: ___________ Time:  ____________    Patient's Signature ___________________    Date: ______________ Time: ____________    Clinician's Signature_____________________    Date:_______________Time: _____________

## 2019-11-11 NOTE — WOUND CARE
11/11/19 1437 Wound Toe (Comment  which one) Left;Dorsal 3rd toe Date First Assessed/Time First Assessed: 10/14/19 1430   Present on Hospital Admission: Yes  Primary Wound Type: Diabetic Ulcer  Location: Toe (Comment  which one)  Wound Location Orientation: Left;Dorsal  Wound Description: 3rd toe Dressing Status Intact; Removed Dressing Type Gauze Wound Length (cm) 1.4 cm Wound Width (cm) 0.9 cm Wound Depth (cm) 0.2 cm Wound Surface Area (cm^2) 1.26 cm^2 Wound Volume (cm^3) 0.25 cm^3 Condition of Base Eschar Drainage Amount Scant Drainage Color Serosanguinous Wound Odor None Megan-wound Assessment Intact Cleansing and Cleansing Agents  Normal saline Visit Vitals /83 Pulse (!) 51 Temp 98.2 °F (36.8 °C) Resp 16

## 2019-11-12 NOTE — PROGRESS NOTES
[address] [place] [address] PROGRESS NOTE    Name:  Cachorro Haywood  MR#:  [id number]  :  1940  ACCOUNT #:  [de-identified]  DATE OF SERVICE:  2019    TREATING PHYSICIAN:  Adilia Jensen DPM    SUBJECTIVE:  The patient presents today for followup to a left dorsal third toe ulceration. He recently had an MRI, which was negative for osteomyelitis. The patient denies any nausea, vomiting, fever, night sweats, or chills. He states the pain in the toes feels a lot better compared to last week. He states he has been doing his wound care every day. OBJECTIVE:  VITAL SIGNS:  Stable. The patient is afebrile. WOUND EXAMINATION:  On left foot exam, there is 1.4 x 0.9 x 0.2 cm wound along the dorsal aspect of the left third toe. The wound is covered by an eschar. There is no purulent drainage. No malodor. No erythema. No edema in the toe. DP and PT pulses are nonpalpable. Protective sensation is diminished. ASSESSMENT:  1. Left third toe ulceration. 2.  Diabetes with peripheral neuropathy and peripheral vascular disease. PLAN:  1. Clinically, the patient's toe appears to be doing better than before. We will continue Betadine everyday. The patient is to continue wearing accommodating shoes. 2.  I reiterated to the patient that even though his MRI was negative, we are not out of the woods yet and that the toe can easily get worse before it gets better. The patient is to follow up with me in two weeks.         KINA Go/[email or web address]/[email or web address]  D:  2019 14:54  T:  2019 3:35  JOB #:  [id number]

## 2019-11-25 ENCOUNTER — HOSPITAL ENCOUNTER (OUTPATIENT)
Dept: WOUND CARE | Age: 79
Discharge: HOME | End: 2019-11-25
Payer: MEDICARE

## 2019-11-25 VITALS — SYSTOLIC BLOOD PRESSURE: 151 MMHG | HEART RATE: 55 BPM | TEMPERATURE: 97.7 F | DIASTOLIC BLOOD PRESSURE: 70 MMHG

## 2019-11-25 PROCEDURE — 74011000250 HC RX REV CODE- 250: Performed by: PODIATRIST

## 2019-11-25 PROCEDURE — 99212 OFFICE O/P EST SF 10 MIN: CPT

## 2019-11-25 RX ADMIN — Medication: at 14:29

## 2019-11-25 NOTE — DISCHARGE INSTRUCTIONS
Discharge Instructions for  [address] [facility], [address]  Telephone: [phone]    NAME:  Nora Sapp OF BIRTH:  1940  MEDICAL RECORD NUMBER:  [id number]  DATE:  11/25/2019    Wound Cleansing:   Do not scrub or use excessive force. Cleanse wound prior to applying a clean dressing with:  [x] Normal Saline [x] Keep Wound Dry in Shower       Topical Treatments:  Do not apply lotions, creams, or ointments to wound bed unless directed. Dressings:           Wound Location  Left 3rd toe    [] Apply Primary Dressing:  Betadine wet to dry     Avoid contact of tape with skin. [] Change dressing: [x] Daily        Dietary:  [x] Diet as tolerated: [x] Increase Protein:     Activity:  [x] Activity as tolerated:             Return Appointment:  [x] Return Appointment: With Dyke Dubin   in 2 weeks  [] Ordered tests:      Electronically signed on 11/25/2019 at [address]: Should you experience any significant changes in your wound(s) or have questions about your wound care, please contact the [address] [place] [address] at [address] [place] [address] 8:00 am - 4:30. If you need help with your wound outside these hours and cannot wait until we are again available, contact your PCP or go to the hospital emergency room. PLEASE NOTE: IF YOU ARE UNABLE TO OBTAIN WOUND SUPPLIES, CONTINUE TO USE THE SUPPLIES YOU HAVE AVAILABLE UNTIL YOU ARE ABLE TO REACH US. IT IS MOST IMPORTANT TO KEEP THE WOUND COVERED AT ALL TIMES.   Physician Signature:_______________________    Date: ___________ Time:  ____________    Patient's Signature ___________________    Date: ______________ Time: ____________    Clinician's Signature_____________________    Date:_______________Time: _____________

## 2019-11-25 NOTE — WOUND CARE
11/25/19 1421 Wound Toe (Comment  which one) Left;Dorsal 3rd toe Date First Assessed/Time First Assessed: 10/14/19 1430   Present on Hospital Admission: Yes  Primary Wound Type: Diabetic Ulcer  Location: Toe (Comment  which one)  Wound Location Orientation: Left;Dorsal  Wound Description: 3rd toe Dressing Status Removed Dressing Type Gauze Wound Length (cm) 0.3 cm Wound Width (cm) 0.3 cm Wound Depth (cm) 0.3 cm Wound Surface Area (cm^2) 0.09 cm^2 Wound Volume (cm^3) 0.03 cm^3 Condition of Base Eschar;Pink Drainage Amount Scant Drainage Color Serosanguinous Wound Odor None Megan-wound Assessment Intact Cleansing and Cleansing Agents  Normal saline Dressing Type Applied Other (Comment);2 x 2;Special tape (comment) (Betadine wet to dry ) Discharge Condition: Stable Pain: 0 Ambulatory Status: [place] Car Discharge Destination: Home Transportation: Car Accompanied by: Self Discharge instructions reviewed with Patient and copy or written instructions have been provided. All questions/concerns have been addressed at this time.

## 2019-11-25 NOTE — WOUND CARE
11/25/19 1421 Wound Toe (Comment  which one) Left;Dorsal 3rd toe Date First Assessed/Time First Assessed: 10/14/19 1430   Present on Hospital Admission: Yes  Primary Wound Type: Diabetic Ulcer  Location: Toe (Comment  which one)  Wound Location Orientation: Left;Dorsal  Wound Description: 3rd toe Dressing Status Removed Dressing Type Gauze Wound Length (cm) 0.3 cm Wound Width (cm) 0.3 cm Wound Depth (cm) 0.3 cm Wound Surface Area (cm^2) 0.09 cm^2 Wound Volume (cm^3) 0.03 cm^3 Condition of Base Eschar;Pink Drainage Amount Scant Drainage Color Serosanguinous Wound Odor None Megan-wound Assessment Intact Cleansing and Cleansing Agents  Normal saline Due to network connection error unable to obtain and upload image

## 2019-11-26 NOTE — PROGRESS NOTES
[address] [place] [address] PROGRESS NOTE    Name:  Minal Dean  MR#:  [id number]  :  1940  ACCOUNT #:  [de-identified]  DATE OF SERVICE:  2019      TREATING PHYSICIAN:  Georgi Arora DPM    SUBJECTIVE:  The patient presents today for followup to a left third toe ulceration. Denies any nausea, vomiting, fevers, night sweats, or chills. OBJECTIVE:  VITAL SIGNS:  Stable. The patient is afebrile. WOUND EXAMINATION:  Left foot exam:  There is a 0.3 x 0.3 x 0.3 cm wound along the dorsal aspect of the left third toe. The wound has a granular base with some eschar. There is no erythema. No purulent drainage or malodor. DP and PT pulses are nonpalpable. Protective sensation is absent. ASSESSMENT:  1. Left third toe ulceration. 2.  Diabetes with peripheral neuropathy. 3.  Diabetes with peripheral vascular disease. PLAN:  Clinically, the left third toe wound is stable and appears to be smaller and healing appropriately. We will continue Betadine once a day. The patient is to continue wearing appropriate shoe gear that has a wide toe box. He is to follow up with me in 2 weeks.       KINA Pratt/[email or web address]/[email or web address]  D:  2019 14:34  T:  2019 2:50  JOB #:  [id number]

## 2019-12-09 ENCOUNTER — HOSPITAL ENCOUNTER (OUTPATIENT)
Dept: WOUND CARE | Age: 79
Discharge: HOME | End: 2019-12-09
Payer: MEDICARE

## 2019-12-09 VITALS
TEMPERATURE: 96.6 F | DIASTOLIC BLOOD PRESSURE: 76 MMHG | SYSTOLIC BLOOD PRESSURE: 144 MMHG | HEART RATE: 56 BPM | RESPIRATION RATE: 17 BRPM

## 2019-12-09 PROCEDURE — 99212 OFFICE O/P EST SF 10 MIN: CPT

## 2019-12-09 NOTE — DISCHARGE INSTRUCTIONS
Discharge Instructions for  [place] [address], [address] [place] [address] Avenue  Telephone: [phone]    NAME:  Nora Sapp OF BIRTH:  1940  MEDICAL RECORD NUMBER:  [id number]  DATE:  12/09/2019    Wound Cleansing:   Do not scrub or use excessive force. Cleanse wound prior to applying a clean dressing with:  [x] Normal Saline [x] Keep Wound Dry in Shower       Topical Treatments:  Do not apply lotions, creams, or ointments to wound bed unless directed. Dressings:           Wound Location  Left 3rd toe    [] Apply Primary Dressing:  Betadine wet to dry     Avoid contact of tape with skin. [] Change dressing: [x] Daily        Dietary:  [x] Diet as tolerated: [x] Increase Protein:     Activity:  [x] Activity as tolerated:             Return Appointment:  [x] Return Appointment: With Dyke Dubin   in 2 weeks  [] Ordered tests:      Electronically signed on 12/09/2019 by PINO PimentelN, RN at 2:59 pm    Nellie [address]: Should you experience any significant changes in your wound(s) or have questions about your wound care, please contact the [address] [place] [address] at [address] [place] [address] 8:00 am - 4:30. If you need help with your wound outside these hours and cannot wait until we are again available, contact your PCP or go to the hospital emergency room. PLEASE NOTE: IF YOU ARE UNABLE TO OBTAIN WOUND SUPPLIES, CONTINUE TO USE THE SUPPLIES YOU HAVE AVAILABLE UNTIL YOU ARE ABLE TO REACH US. IT IS MOST IMPORTANT TO KEEP THE WOUND COVERED AT ALL TIMES.   Physician Signature:_______________________    Date: ___________ Time:  ____________    Patient's Signature ___________________    Date: ______________ Time: ____________    Clinician's Signature_____________________    Date:_______________Time: _____________

## 2019-12-09 NOTE — WOUND CARE
12/09/19 1500 Wound Toe (Comment  which one) Left;Dorsal 3rd toe Date First Assessed/Time First Assessed: 10/14/19 1430   Present on Hospital Admission: Yes  Primary Wound Type: Diabetic Ulcer  Location: Toe (Comment  which one)  Wound Location Orientation: Left;Dorsal  Wound Description: 3rd toe Dressing Type Applied Other (Comment) (Betadine wet to dry dressing. ) Discharge Condition: Stable Pain: 0 Ambulatory Status: Walking and Walker Discharge Destination: Home Transportation: Car Accompanied by: Self Discharge instructions reviewed with Patient and copy or written instructions have been provided. All questions/concerns have been addressed at this time.

## 2019-12-09 NOTE — WOUND CARE
Clinic Level of Care Assessment NAME:  Polly Domínguez YOB: 1940 GENDER: male MEDICAL RECORD NUMBER:  [id number] DATE:  12/9/2019 Wound Count Document in [facility] Number of Wounds Assessed Points No Wounds/Ulcers []   0 Less than Three Wounds/Ulcers [x]   1  
3-6 Wounds/Ulcers []   2 Greater than 6 Wounds/Ulcers []   3 Ambulation Status Document in Coord/MCKENNA/Mobility tab Status Definition Points Independent Independently able to ambulate. Fully able (without any assistance) to get on/off exam table/chair. [x]   0 Minimal Physical Assistance Requires physical assistance of one person to ambulate and/or position patient to be examined. Includes necessary physical assistance to position lower extremities on/off stool. []   1 Moderate Physical Assistance Requires at least one staff member to physically assist patient in ambulating into treatment room, and on/off exam table. []   2 Full Assistance Requires assistance of at least two staff members to transfer patient into treatment room and/or on/off exam table/chair. \"Total Transfer\". []   3 Dressing Complexity Document in [facility] and Write Appropriate Order Complexity Definition Points No Dressing  []   0 Simple Minimal, simple dressing. i.e. Band-aid, gauze, simple wrap. []   1 Intermediate Moderately complicated requiring licensed personnel to apply i.e. collagen matrix, ointments, gels, alginates. [x]   2 Complex Complicated requiring licensed personnel to apply dressings 6 or more wounds. []   3 Teaching Effort Document in Education Tab Effort Definition Points No Teaching  []   0 Simple Reinforce two or less topics. Document in Education navigator. [x]   1 Intermediate Reinforce three to five topics and/or one additional  
new topic. Document in Education navigator. []   2 Complex Teach more than one new topic. New patient information  
packet reviewed and/or reinforce more than three topics. Document in Education navigator. HBO initial instruction. []   3 Patient Assessment and Planning Planning Definition Points Simple Multiple System Simple: Simple follow-up with routine assessment and planning. If Discharged, instructions and long term/follow-up care given to patient/caregiver. Discharged, instructions and/or After Visit Summary given to patient/caregiver and instructions completed. [x]   1 Intermediate Multiple System Intermediate: Contact with outside resources; i.e. Telephone calls to home health, [facility] [place] [facility]. May include filling out forms and writing letters, arranging transportation, communication with insurance , vendors, etc.  Discharged, instructions and/or After Visit Summary given to patient/caregiver and instructions completed. []   2 Complex Multiple System Complex: Full, comprehensive assessment and planning. Follow the entire navigator under Wound Visit charting filling out each tab which includes OP Adm Database Screening, Education and CarePlan  HBO risk assessment completed. Discharged, instructions and/or After Visit Summary given to patient/caregiver and instructions completed. []   3 Is this the Patient's First Visit to the [address] [place] [address] Road No 
 
 
Is this Patient Established @ [facility] 
Yes Clinical Level of Care Points  0-2  Level 1 [] Points  3-5  Level 2 [x] Points  6-9  Level 3 [] Points  10-12  Level 4 [] Points  13-15  Level 5 [] Electronically signed by Tristan Hope RN on 12/9/2019 at 5:52 PM

## 2019-12-23 ENCOUNTER — HOSPITAL ENCOUNTER (OUTPATIENT)
Dept: WOUND CARE | Age: 79
Discharge: HOME | End: 2019-12-23
Payer: MEDICARE

## 2019-12-23 VITALS
HEART RATE: 57 BPM | TEMPERATURE: 97.2 F | DIASTOLIC BLOOD PRESSURE: 67 MMHG | SYSTOLIC BLOOD PRESSURE: 130 MMHG | RESPIRATION RATE: 18 BRPM

## 2019-12-23 PROCEDURE — 99212 OFFICE O/P EST SF 10 MIN: CPT

## 2019-12-23 NOTE — PROGRESS NOTES
12/23/19 1406   Wound Toe (Comment  which one) Left;Dorsal 3rd toe   Date First Assessed/Time First Assessed: 10/14/19 1430   Present on Hospital Admission: Yes  Primary Wound Type: Diabetic Ulcer  Location: Toe (Comment  which one)  Wound Location Orientation: Left;Dorsal  Wound Description: 3rd toe   Wound Length (cm) 0.1 cm   Wound Width (cm) 0.1 cm   Wound Depth (cm) 0.1 cm   Wound Surface Area (cm^2) 0.01 cm^2   Wound Volume (cm^3) 0 cm^3     Visit Vitals  /67   Pulse (!) 57   Temp 97.2 °F (36.2 °C)   Resp 18

## 2019-12-23 NOTE — DISCHARGE INSTRUCTIONS
Discharge Instructions for  [facility]. [address] [facility], [address]  Telephone: [phone]    NAME:  Cosme Dhillon OF BIRTH:  1940  MEDICAL RECORD NUMBER:  [id number]  DATE:  12/23/2019    Wound Cleansing:   Do not scrub or use excessive force. Cleanse wound prior to applying a clean dressing with:  [x] Normal Saline [x] Keep Wound Dry in Shower       Topical Treatments:  Do not apply lotions, creams, or ointments to wound bed unless directed. Dressings:           Wound Location  Left 3rd toe    [] Apply Primary Dressing:  Betadine wet to dry     Avoid contact of tape with skin. [] Change dressing: [x] Daily        Dietary:  [x] Diet as tolerated: [x] Increase Protein:     Activity:  [x] Activity as tolerated:             Return Appointment:  [x] Return Appointment: With Desiree Ho as needed. [] Ordered tests:      Electronically signed on 12/23/2019 by Roberta Lira RN at    [facility] 281: Should you experience any significant changes in your wound(s) or have questions about your wound care, please contact the [address] [place] [address] at [address] [place] [address] 8:00 am - 4:30. If you need help with your wound outside these hours and cannot wait until we are again available, contact your PCP or go to the hospital emergency room. PLEASE NOTE: IF YOU ARE UNABLE TO OBTAIN WOUND SUPPLIES, CONTINUE TO USE THE SUPPLIES YOU HAVE AVAILABLE UNTIL YOU ARE ABLE TO REACH US. IT IS MOST IMPORTANT TO KEEP THE WOUND COVERED AT ALL TIMES.   Physician Signature:_______________________    Date: ___________ Time:  ____________    Patient's Signature ___________________    Date: ______________ Time: ____________    Clinician's Signature_____________________    Date:_______________Time: _____________

## 2019-12-23 NOTE — WOUND CARE
Clinic Level of Care Assessment NAME:  Jass Barcenas YOB: 1940 GENDER: male MEDICAL RECORD NUMBER:  [id number] DATE:  12/23/2019 Wound Count Document in [facility] Number of Wounds Assessed Points No Wounds/Ulcers []   0 Less than Three Wounds/Ulcers [x]   1  
3-6 Wounds/Ulcers []   2 Greater than 6 Wounds/Ulcers []   3 Ambulation Status Document in Coord/MCKENNA/Mobility tab Status Definition Points Independent Independently able to ambulate. Fully able (without any assistance) to get on/off exam table/chair. [x]   0 Minimal Physical Assistance Requires physical assistance of one person to ambulate and/or position patient to be examined. Includes necessary physical assistance to position lower extremities on/off stool. []   1 Moderate Physical Assistance Requires at least one staff member to physically assist patient in ambulating into treatment room, and on/off exam table. []   2 Full Assistance Requires assistance of at least two staff members to transfer patient into treatment room and/or on/off exam table/chair. \"Total Transfer\". []   3 Dressing Complexity Document in [facility] and Write Appropriate Order Complexity Definition Points No Dressing  []   0 Simple Minimal, simple dressing. i.e. Band-aid, gauze, simple wrap. []   1 Intermediate Moderately complicated requiring licensed personnel to apply i.e. collagen matrix, ointments, gels, alginates. [x]   2 Complex Complicated requiring licensed personnel to apply dressings 6 or more wounds. []   3 Teaching Effort Document in Education Tab Effort Definition Points No Teaching  []   0 Simple Reinforce two or less topics. Document in Education navigator. [x]   1 Intermediate Reinforce three to five topics and/or one additional  
new topic. Document in Education navigator. []   2 Complex Teach more than one new topic. New patient information  
packet reviewed and/or reinforce more than three topics. Document in Education navigator. HBO initial instruction. []   3 Patient Assessment and Planning Planning Definition Points Simple Multiple System Simple: Simple follow-up with routine assessment and planning. If Discharged, instructions and long term/follow-up care given to patient/caregiver. Discharged, instructions and/or After Visit Summary given to patient/caregiver and instructions completed. [x]   1 Intermediate Multiple System Intermediate: Contact with outside resources; i.e. Telephone calls to home health, [facility]. May include filling out forms and writing letters, arranging transportation, communication with insurance , vendors, etc.  Discharged, instructions and/or After Visit Summary given to patient/caregiver and instructions completed. []   2 Complex Multiple System Complex: Full, comprehensive assessment and planning. Follow the entire navigator under Wound Visit charting filling out each tab which includes OP Adm Database Screening, Education and CarePlan  HBO risk assessment completed. Discharged, instructions and/or After Visit Summary given to patient/caregiver and instructions completed. []   3 Is this the Patient's First Visit to the [address] [place] [address] Road No 
 
 
Is this Patient Established @ [facility] 
Yes Clinical Level of Care Points  0-2  Level 1 [] Points  3-5  Level 2 [x] Points  6-9  Level 3 [] Points  10-12  Level 4 [] Points  13-15  Level 5 [] Electronically signed by Roberta Lira RN on 12/23/2019 at 4:30 PM

## 2019-12-24 NOTE — PROGRESS NOTES
[address] [place] [address] PROGRESS NOTE    Name:  Mulugeta Boudreaux  MR#:  [id number]  :  1940  ACCOUNT #:  [de-identified]  DATE OF SERVICE:  2019      TREATING PHYSICIAN:  Maria Luisa Cruz DPM    SUBJECTIVE:  The patient presents today for followup to a left third toe ulceration. Denies any nausea, vomiting, fever, night sweats, or chills. He states the left toe is doing great and is not painful. He states he has been doing the wound care as instructed. OBJECTIVE:  VITAL SIGNS:  Stable. The patient is afebrile. WOUND EXAMINATION:  Left Foot Exam:  The previously mentioned wound along the dorsal aspect of the left third toe is healed with an eschar. There is no purulent drainage. No malodor. No erythema. No sign of infection. DP and PT pulses are nonpalpable. Protective sensation is diminished. ASSESSMENT:  1. Left third toe ulceration. 2.  Diabetes with peripheral neuropathy and diabetes with peripheral vascular disease. PLAN:  1. Clinically, the wound is healed with an eschar. The patient is to continue using Betadine for now. 2.  The patient is discharged from the wound care center. He is to follow up with me in my regular office for his routine diabetic checkups. He is to call if having problems.         KINA Bains/[email or web address]_[email or web address]/[email or web address]  D:  2019 14:28  T:  2019 8:48  JOB #:  [id number]

## 2021-10-20 ENCOUNTER — HOSPITAL ENCOUNTER (OUTPATIENT)
Dept: WOUND CARE | Age: 81
Discharge: HOME | End: 2021-10-20
Payer: MEDICARE

## 2021-10-20 VITALS — DIASTOLIC BLOOD PRESSURE: 75 MMHG | TEMPERATURE: 98.4 F | HEART RATE: 65 BPM | SYSTOLIC BLOOD PRESSURE: 136 MMHG

## 2021-10-20 PROCEDURE — 99213 OFFICE O/P EST LOW 20 MIN: CPT

## 2021-10-20 RX ORDER — LANOLIN ALCOHOL/MO/W.PET/CERES
325 CREAM (GRAM) TOPICAL
COMMUNITY

## 2021-10-20 NOTE — DISCHARGE INSTRUCTIONS
Discharge Instructions/Wound [address] [place] [address]. [address]  [place], [address]  Phone: [phone] Fax: [phone]    WOUND CARE ORDERS:  Buttock wounds-  Place vasoline to intact skin around wounds. Cover with foam border. Change every other day or as needed if soiled. Obtain chair cushion such as offloading donut or EHOB cushion. Follow up with your primary care physician (Dr. Kathya Carlos) to discuss having home health ordered for safety evaluation with assistive devices and possible OT/PT. TREATMENT ORDERS:  Turn/reposition every 2 hours when in bed, avoid direct pressure on wound site. When sitting, shift position or do seat lifts every 15 minutes. Limit side lying to 30 degree tilt. Limit HOB elevation to 30 degrees. Use speciality pressure relief cushion, mattress as appropriate. Follow diet as prescribed:  [] Diet as tolerated: [x] Calorie Diabetic Diet:Low carb and no Sugar [] No Added Salt:[x] Increase Protein: [] Other:Limit the amount of liquid you are drinking and avoid drinking in between meals              Return Appointment:  [x] Return Appointment: With Dr Cheryn Gosselin in [facility])  [] Ordered tests:    Electronically signed Liz Cosby RN on 10/20/2021 at 9:31 AM     Nellie [facility] 281: Should you experience any significant changes in your wound(s) or have questions about your wound care, please contact the [facility] Main at [address] [place] [address] 8:00 am - 4:30. If you need help with your wound outside these hours and cannot wait until we are again available, contact your PCP or go to the hospital emergency room. PLEASE NOTE: IF YOU ARE UNABLE TO OBTAIN WOUND SUPPLIES, CONTINUE TO USE THE SUPPLIES YOU HAVE AVAILABLE UNTIL YOU ARE ABLE TO REACH US. IT IS MOST IMPORTANT TO KEEP THE WOUND COVERED AT ALL TIMES.      Physician Signature:_______________________    Date: ___________ Time:  ____________ Discharge Instructions/Wound Care Orders

## 2021-10-20 NOTE — WOUND CARE
10/20/21 0908   Wound Buttocks Left partial thickness stage 2 PI 10/20/21   Date First Assessed/Time First Assessed: 10/20/21 0903   Present on Hospital Admission: Yes  Wound Approximate Age at First Assessment (Weeks): 2 weeks  Primary Wound Type: Pressure Injury  Location: Buttocks  Wound Location Orientation: Left  Wound D... Wound Image    Wound Etiology Pressure Stage 2   Dressing Status Dry   Cleansed Not cleansed   Wound Length (cm) 0.5 cm   Wound Width (cm) 1 cm   Wound Depth (cm) 0.1 cm   Wound Surface Area (cm^2) 0.5 cm^2   Wound Volume (cm^3) 0.05 cm^3   Wound Assessment Pink/red   Drainage Amount Scant   Drainage Description Serosanguinous   Wound Odor None   Megan-Wound/Incision Assessment Blanchable erythema;Dry/flaky   Edges Flat/open edges   Wound Thickness Description Partial thickness   Wound Buttocks Right partial thicknes PI stage 2 10/20/21   Date First Assessed/Time First Assessed: 10/20/21 0908   Present on Hospital Admission: Yes  Wound Approximate Age at First Assessment (Weeks): 2 weeks  Primary Wound Type: Pressure Injury  Location: Buttocks  Wound Location Orientation: Right  Wound . ..    Wound Image    Wound Etiology Pressure Stage 2   Dressing Status Dry   Wound Length (cm) 3 cm   Wound Width (cm) 2 cm   Wound Depth (cm) 0.1 cm   Wound Surface Area (cm^2) 6 cm^2   Wound Volume (cm^3) 0.6 cm^3   Wound Assessment Pink/red   Drainage Amount Scant   Drainage Description Serosanguinous   Wound Odor None   Megan-Wound/Incision Assessment Blanchable erythema;Dry/flaky   Edges Flat/open edges   Wound Thickness Description Partial thickness

## 2021-10-20 NOTE — WOUND CARE
Wound Care Consultation        Assessment:     [de-identified] y.o. male with Pressure b/l ischial ulcer L89.312, L89.322  Debility R54  Diabetic neuropathy E11.40  Morbid Obesity E66.01    Recommendations:     Buttock wounds-  Place Vaseline to intact skin around wounds. Cover with foam border. Change every other day or as needed if soiled. Obtain chair cushion such as offloading donut or EHOB cushion. Follow up with your primary care physician (Dr. Hiram Steele) to discuss having home health ordered for safety evaluation with assistive devices and possible OT/PT. Turn/reposition every 2 hours when in bed, avoid direct pressure on wound site. When sitting, shift position or do seat lifts every 15 minutes. Limit side lying to 30 degree tilt. Limit HOB elevation to 30 degrees. Use speciality pressure relief cushion, mattress as appropriate. Patient understood and agrees with plan. Questions answered. Follow up with me in 3 week. History of Present Illness:      Elana Martinez is a [de-identified] y.o. male  male for follow up of Bilateral  Pressure ulcer to the Ischium  To Fat Layer Ulcer has been present for 3 weeks. Patient has diabetic polyneuropathy and now debilitated for few months. He cannot walk more than 10 steps with walker. He stays all day long 24 hours downstairs in a recliner chair, and mostly bed bound in his case, chair bound. He has fallen few times already. His wife cannot hold him or lift him due to his weight and wife's own age etc. His wife is putting Vaseline on wound so far and saw some improvement. Doing well. No concerns. Wound care going well. Offloading wound: no  Appetite: good  Wound associated pain: mild  Diabetic: yes Type 2  Insulin Dependent  Smoker:No  Pt is Roxanne Pack MD. Consultation was requested for assistance with chronic wound care.       Past Medical History:   Diagnosis Date    Arthritis     CAD (coronary artery disease)     Cancer ([facility] [address]     PROSTATE    Chronic pain     Diabetes ([facility] [address]     TYPE 2    Diabetes ([facility] [address]     GERD (gastroesophageal reflux disease)     Hypertension     Psychiatric disorder     DEPRESSION     Past Surgical History:   Procedure Laterality Date    HX COLONOSCOPY      HX GI      COLONOSCOPY    HX HEENT      HX ORTHOPAEDIC Left     TRIGGER FINGER, TENDON RELEASE    HX ORTHOPAEDIC      CERVICAL FUSION (C3, C4-5)    HX ORTHOPAEDIC      lumbar fusion x3    HX PROSTATECTOMY      HX PROSTATECTOMY      HX UROLOGICAL      PROSTATECTOMY    NEUROLOGICAL PROCEDURE UNLISTED      CERVICAL FUSION    IN CARDIAC SURG PROCEDURE UNLIST  2014    CARDIAC CATH    IN CARDIAC SURG PROCEDURE UNLIST       STENT X3    IN CARDIAC SURG PROCEDURE UNLIST      CAD WITH 2 STENTS      Family History   Problem Relation Age of Onset    Hypertension Mother     Huntingtons disease Mother     Heart Disease Father     Diabetes Father     Heart Disease Sister         BYPASS SURGERY    Heart Disease Brother     Cancer Brother     Heart Disease Maternal Aunt         BYPASS SURGERY    Anesth Problems Neg Hx      Social History     Socioeconomic History    Marital status:      Spouse name: Not on file    Number of children: Not on file    Years of education: Not on file    Highest education level: Not on file   Tobacco Use    Smoking status: Former Smoker     Packs/day: 1.00     Years: 15.00     Pack years: 15.00     Quit date: 4/10/1971     Years since quittin.5    Smokeless tobacco: Never Used   Vaping Use    Vaping Use: Never used   Substance and Sexual Activity    Alcohol use: No    Drug use: No    Sexual activity: Yes   Other Topics Concern   Social History Narrative    ** Merged History Encounter **          Social Determinants of Health     Financial Resource Strain:     Difficulty of Paying Living Expenses:    Food Insecurity:     Worried About Running Out of Food in the Last Year:     Gerard of Food in the Last Year: Transportation Needs:     Lack of Transportation (Medical):  Lack of Transportation (Non-Medical):    Physical Activity:     Days of Exercise per Week:     Minutes of Exercise per Session:    Stress:     Feeling of Stress :    Social Connections:     Frequency of Communication with Friends and Family:     Frequency of Social Gatherings with Friends and Family:     Attends Holiness Services:     Active Member of Clubs or Organizations:     Attends Club or Organization Meetings:     Marital Status:       Current Outpatient Medications   Medication Sig    aspirin 81 mg cap Take 81 mg by mouth.  ferrous sulfate (Iron) 325 mg (65 mg iron) tablet Take 325 mg by mouth Daily (before breakfast).  acetaminophen/diphenhydramine (TYLENOL PM PO) Take 1,000 mg by mouth.  insulin glargine (LANTUS) 100 unit/mL injection 8 Units by SubCUTAneous route nightly.  pioglitazone (ACTOS) 30 mg tablet Take 45 mg by mouth daily.  pregabalin (LYRICA) 150 mg capsule Take 150 mg by mouth three (3) times daily.  esomeprazole (NEXIUM) 40 mg capsule Take 40 mg by mouth daily.  busPIRone (BUSPAR) 10 mg tablet Take 10 mg by mouth two (2) times a day.  atorvastatin (LIPITOR) 10 mg tablet Take 10 mg by mouth nightly.  citalopram (CELEXA) 40 mg tablet Take 40 mg by mouth every evening.  clopidogrel (PLAVIX) 75 mg tablet Take 75 mg by mouth daily.  metoprolol (LOPRESSOR) 25 mg tablet Take 25 mg by mouth nightly.  glipiZIDE (GLUCOTROL) 10 mg tablet Take 10 mg by mouth two (2) times a day.  lisinopril-hydrochlorothiazide (PRINZIDE, ZESTORETIC) 20-12.5 mg per tablet Take 1 Tab by mouth daily.  metFORMIN (GLUCOPHAGE) 1,000 mg tablet Take 500 mg by mouth two (2) times daily (with meals).  pantoprazole (PROTONIX) 40 mg tablet Take 40 mg by mouth daily. (Patient not taking: Reported on 10/20/2021)     No current facility-administered medications for this encounter.       Allergies   Allergen Reactions    Ciprofloxacin Nausea Only     Diarrhea, lethargy    Keflex [Cephalexin] Diarrhea     Diarrhea, lethargy, nausea        Review of Systems:  A comprehensive review of systems was negative except for that written in the History of Present Illness. Physical Exam:     VS:   Vitals:    10/20/21 0840   BP: 136/75   Pulse: 65   Temp: 98.4 °F (36.9 °C)       General:  alert and oriented times 3, Obese   Ears: ENT exam normal, no neck nodes or sinus tenderness. Chest: CTA b/l  Heart: RRR S1S2  Abdomen: soft, NT, ND, BS+  Extremities: mild edema         Wound Back Posterior (Active)   Number of days: 1980       Wound Toe Left (Active)   Number of days: 1262       Wound Toe (Comment  which one) Left;Dorsal 3rd toe (Active)   Dressing Status Removed 12/09/19 1430   Dressing/Treatment Gauze 12/09/19 1430   Wound Length (cm) 0.1 cm 12/23/19 1406   Wound Width (cm) 0.1 cm 12/23/19 1406   Wound Depth (cm) 0.1 cm 12/23/19 1406   Wound Surface Area (cm^2) 0.01 cm^2 12/23/19 1406   Wound Volume (cm^3) 0 cm^3 12/23/19 1406   Drainage Amount Scant 12/09/19 1430   Drainage Description Serosanguinous 12/09/19 1430   Wound Odor None 12/09/19 1430   Megan-Wound/Incision Assessment Intact; Blanchable erythema 12/09/19 1430   Wound Thickness Description Full thickness 10/14/19 1431   Number of days: 737       Wound Buttocks Left partial thickness stage 2 PI 10/20/21 (Active)   Wound Image   10/20/21 0908   Wound Etiology Pressure Stage 2 10/20/21 0908   Dressing Status Dry 10/20/21 0908   Cleansed Not cleansed 10/20/21 0908   Wound Length (cm) 0.5 cm 10/20/21 0908   Wound Width (cm) 1 cm 10/20/21 0908   Wound Depth (cm) 0.1 cm 10/20/21 0908   Wound Surface Area (cm^2) 0.5 cm^2 10/20/21 0908   Wound Volume (cm^3) 0.05 cm^3 10/20/21 0908   Wound Assessment Pink/red 10/20/21 0908   Drainage Amount Scant 10/20/21 0908   Drainage Description Serosanguinous 10/20/21 0908   Wound Odor None 10/20/21 0908   Megan-Wound/Incision Assessment Blanchable erythema;Dry/flaky 10/20/21 0908   Edges Flat/open edges 10/20/21 0908   Wound Thickness Description Partial thickness 10/20/21 0908   Number of days: 0       Wound Buttocks Right partial thicknes PI stage 2 10/20/21 (Active)   Wound Image   10/20/21 0908   Wound Etiology Pressure Stage 2 10/20/21 0908   Dressing Status Dry 10/20/21 0908   Wound Length (cm) 3 cm 10/20/21 0908   Wound Width (cm) 2 cm 10/20/21 0908   Wound Depth (cm) 0.1 cm 10/20/21 0908   Wound Surface Area (cm^2) 6 cm^2 10/20/21 0908   Wound Volume (cm^3) 0.6 cm^3 10/20/21 0908   Wound Assessment Pink/red 10/20/21 0908   Drainage Amount Scant 10/20/21 0908   Drainage Description Serosanguinous 10/20/21 0908   Wound Odor None 10/20/21 0908   Megan-Wound/Incision Assessment Blanchable erythema;Dry/flaky 10/20/21 0908   Edges Flat/open edges 10/20/21 0908   Wound Thickness Description Partial thickness 10/20/21 0908   Number of days: 0       [REMOVED] Read-Only, Retired.  ZZZ DO NOT USE OLD WOUND LDA Heel Left (Removed)   Number of days: 1980       [REMOVED] Wound Toe (Comment  which one) Right 1st toe (Removed)   Dressing Status Clean, dry, and intact 10/14/19 1431   Dressing/Treatment Open to air 10/14/19 1431   Wound Length (cm) 0.3 cm 10/14/19 1431   Wound Width (cm) 0.3 cm 10/14/19 1431   Wound Depth (cm) 0.1 cm 10/14/19 1431   Wound Volume (cm^3) 0.01 cm^3 10/14/19 1431   Drainage Amount None 10/14/19 1431   Wound Odor None 10/14/19 1431   Megan-Wound/Incision Assessment Intact 10/14/19 1431   Wound Thickness Description Partial thickness 10/14/19 1431   Number of days: 56       [REMOVED] Wound Coccyx 10/20/21 (Removed)   Number of days: 0          Signed By: Letty Pacheco MD     October 20, 2021

## 2024-02-22 NOTE — WOUND CARE
07/30/18 1455 Wound Toe Left Date First Assessed/Time First Assessed: 05/07/18 1553   POA: Yes  Wound Type: Diabetic  Location: (c) Toe  Orientation: Left Cleansing and Cleansing Agents  Normal saline Dressing Type Applied Other (Comment) (santyl ns moist gauze, gauze and tape) Wound care done per MD orders. Patient able to ambulate to car using walker to go home. No s/s of distress. Patient to follow up in 2 weeks with MD in clinic No No No No No No No No No No No No No No No

## (undated) DEVICE — REM POLYHESIVE ADULT PATIENT RETURN ELECTRODE: Brand: VALLEYLAB

## (undated) DEVICE — TRAY PREP DRY W/ PREM GLV 2 APPL 6 SPNG 2 UNDPD 1 OVERWRAP

## (undated) DEVICE — DISPOSABLE TOURNIQUET CUFF SINGLE BLADDER, DUAL PORT AND QUICK CONNECT CONNECTOR: Brand: COLOR CUFF

## (undated) DEVICE — INFECTION CONTROL KIT SYS

## (undated) DEVICE — STERILE POLYISOPRENE POWDER-FREE SURGICAL GLOVES WITH EMOLLIENT COATING: Brand: PROTEXIS

## (undated) DEVICE — MEDI-VAC NON-CONDUCTIVE SUCTION TUBING: Brand: CARDINAL HEALTH

## (undated) DEVICE — ARGYLE FRAZIER SURGICAL SUCTION INSTRUMENT 10 FR/CH (3.3 MM): Brand: ARGYLE

## (undated) DEVICE — SUTURE VCRL SZ 3-0 L27IN ABSRB UD L26MM SH 1/2 CIR J416H

## (undated) DEVICE — BULB SYRINGE, IRRIGATION WITH PROTECTIVE CAP, 60 CC, INDIVIDUALLY WRAPPED: Brand: DOVER

## (undated) DEVICE — PADDING CAST 4 INX5 YD STRL

## (undated) DEVICE — DEVON™ KNEE AND BODY STRAP 60" X 3" (1.5 M X 7.6 CM): Brand: DEVON

## (undated) DEVICE — Device

## (undated) DEVICE — (D)SYR 10ML 1/5ML GRAD NSAF -- PKGING CHANGE USE ITEM 338027

## (undated) DEVICE — SOLUTION IV 1000ML 0.9% SOD CHL

## (undated) DEVICE — HOOK LOCK LATEX FREE ELASTIC BANDAGE 6INX5YD

## (undated) DEVICE — GAUZE SPONGES,12 PLY: Brand: CURITY

## (undated) DEVICE — BANDAGE COMPR 9 FTX4 IN SMOOTH COMFORTABLE SYNTH ESMRK LF

## (undated) DEVICE — SUT ETHLN 3-0 18IN PS1 BLK --

## (undated) DEVICE — NEEDLE HYPO 25GA L1.5IN BVL ORIENTED ECLIPSE

## (undated) DEVICE — DRAPE,EXTREMITY,89X128,STERILE: Brand: MEDLINE

## (undated) DEVICE — DRAPE,REIN 53X77,STERILE: Brand: MEDLINE